# Patient Record
Sex: FEMALE | Race: WHITE | NOT HISPANIC OR LATINO | Employment: OTHER | URBAN - METROPOLITAN AREA
[De-identification: names, ages, dates, MRNs, and addresses within clinical notes are randomized per-mention and may not be internally consistent; named-entity substitution may affect disease eponyms.]

---

## 2017-01-01 ENCOUNTER — TRANSCRIBE ORDERS (OUTPATIENT)
Dept: ADMINISTRATIVE | Age: 82
End: 2017-01-01

## 2017-01-01 ENCOUNTER — APPOINTMENT (OUTPATIENT)
Dept: LAB | Age: 82
End: 2017-01-01
Payer: MEDICARE

## 2017-01-01 ENCOUNTER — ALLSCRIPTS OFFICE VISIT (OUTPATIENT)
Dept: OTHER | Facility: OTHER | Age: 82
End: 2017-01-01

## 2017-01-01 ENCOUNTER — GENERIC CONVERSION - ENCOUNTER (OUTPATIENT)
Dept: OTHER | Facility: OTHER | Age: 82
End: 2017-01-01

## 2017-01-01 DIAGNOSIS — R73.01 IMPAIRED FASTING GLUCOSE: ICD-10-CM

## 2017-01-01 DIAGNOSIS — G62.9 POLYNEUROPATHY: ICD-10-CM

## 2017-01-01 DIAGNOSIS — J84.10 PULMONARY FIBROSIS (HCC): ICD-10-CM

## 2017-01-01 DIAGNOSIS — J31.0 CHRONIC RHINITIS: ICD-10-CM

## 2017-01-01 LAB
EST. AVERAGE GLUCOSE BLD GHB EST-MCNC: 120 MG/DL
HBA1C MFR BLD: 5.8 % (ref 4.2–6.3)
VIT B12 SERPL-MCNC: 603 PG/ML (ref 100–900)

## 2017-01-01 PROCEDURE — 82607 VITAMIN B-12: CPT

## 2017-01-01 PROCEDURE — 36415 COLL VENOUS BLD VENIPUNCTURE: CPT

## 2017-01-01 PROCEDURE — 83036 HEMOGLOBIN GLYCOSYLATED A1C: CPT

## 2017-01-11 ENCOUNTER — ALLSCRIPTS OFFICE VISIT (OUTPATIENT)
Dept: OTHER | Facility: OTHER | Age: 82
End: 2017-01-11

## 2017-01-11 DIAGNOSIS — E78.5 HYPERLIPIDEMIA: ICD-10-CM

## 2017-01-11 DIAGNOSIS — I10 ESSENTIAL (PRIMARY) HYPERTENSION: ICD-10-CM

## 2017-01-18 ENCOUNTER — APPOINTMENT (OUTPATIENT)
Dept: LAB | Age: 82
End: 2017-01-18
Payer: MEDICARE

## 2017-01-18 ENCOUNTER — TRANSCRIBE ORDERS (OUTPATIENT)
Dept: ADMINISTRATIVE | Age: 82
End: 2017-01-18

## 2017-01-18 DIAGNOSIS — I10 ESSENTIAL (PRIMARY) HYPERTENSION: ICD-10-CM

## 2017-01-18 DIAGNOSIS — E78.5 HYPERLIPIDEMIA: ICD-10-CM

## 2017-01-18 LAB
ALBUMIN SERPL BCP-MCNC: 3.8 G/DL (ref 3.5–5)
ALP SERPL-CCNC: 66 U/L (ref 46–116)
ALT SERPL W P-5'-P-CCNC: 31 U/L (ref 12–78)
ANION GAP SERPL CALCULATED.3IONS-SCNC: 7 MMOL/L (ref 4–13)
AST SERPL W P-5'-P-CCNC: 18 U/L (ref 5–45)
BILIRUB SERPL-MCNC: 0.55 MG/DL (ref 0.2–1)
BUN SERPL-MCNC: 13 MG/DL (ref 5–25)
CALCIUM SERPL-MCNC: 9.9 MG/DL (ref 8.3–10.1)
CHLORIDE SERPL-SCNC: 100 MMOL/L (ref 100–108)
CHOLEST SERPL-MCNC: 141 MG/DL (ref 50–200)
CO2 SERPL-SCNC: 32 MMOL/L (ref 21–32)
CREAT SERPL-MCNC: 0.74 MG/DL (ref 0.6–1.3)
ERYTHROCYTE [DISTWIDTH] IN BLOOD BY AUTOMATED COUNT: 12.7 % (ref 11.6–15.1)
GFR SERPL CREATININE-BSD FRML MDRD: >60 ML/MIN/1.73SQ M
GLUCOSE SERPL-MCNC: 110 MG/DL (ref 65–140)
HCT VFR BLD AUTO: 40.4 % (ref 34.8–46.1)
HDLC SERPL-MCNC: 50 MG/DL (ref 40–60)
HGB BLD-MCNC: 13.2 G/DL (ref 11.5–15.4)
LDLC SERPL CALC-MCNC: 63 MG/DL (ref 0–100)
MCH RBC QN AUTO: 29.4 PG (ref 26.8–34.3)
MCHC RBC AUTO-ENTMCNC: 32.7 G/DL (ref 31.4–37.4)
MCV RBC AUTO: 90 FL (ref 82–98)
PLATELET # BLD AUTO: 268 THOUSANDS/UL (ref 149–390)
PMV BLD AUTO: 10 FL (ref 8.9–12.7)
POTASSIUM SERPL-SCNC: 4.3 MMOL/L (ref 3.5–5.3)
PROT SERPL-MCNC: 8.3 G/DL (ref 6.4–8.2)
RBC # BLD AUTO: 4.49 MILLION/UL (ref 3.81–5.12)
SODIUM SERPL-SCNC: 139 MMOL/L (ref 136–145)
TRIGL SERPL-MCNC: 140 MG/DL
WBC # BLD AUTO: 8.65 THOUSAND/UL (ref 4.31–10.16)

## 2017-01-18 PROCEDURE — 85027 COMPLETE CBC AUTOMATED: CPT

## 2017-01-18 PROCEDURE — 80061 LIPID PANEL: CPT

## 2017-01-18 PROCEDURE — 80053 COMPREHEN METABOLIC PANEL: CPT

## 2017-01-18 PROCEDURE — 36415 COLL VENOUS BLD VENIPUNCTURE: CPT

## 2017-10-26 NOTE — PROGRESS NOTES
Assessment  1  Pulmonary fibrosis (515) (J84 10)   2  Chronic hypoxemic respiratory failure (518 83,799 02) (J96 11)    Plan  Chronic hypoxemic respiratory failure    · Azithromycin 250 MG Oral Tablet; Take as directed   Rx By: Dawna Cline; Dispense: 5 Days ; #:1 X 6 Tablet Box; Refill: 2;For: Chronic hypoxemic respiratory failure; SETH = N; Print Rx   · Oxygen Supplies; Status:Complete;   Done: 26LCY3328   Perform:[facility]; Order Comments:Please provide patient with 2 Oxymizers; Due:07Sfy3394;Ordered; For:Chronic hypoxemic respiratory failure; Ordered By:Canelo De La Rosa;  Pulmonary fibrosis    · 1 - Fide Phillips DO  Palliative Care Co-Management  *  Status: Hold For - Scheduling   Requested for: 14Qgh7576   Ordered; For: Pulmonary fibrosis; Ordered By: Dawna Cline Performed:  Due: 49IHU0736  Care Summary provided  : Yes   · Follow-up visit in 1 year Evaluation and Treatment  Follow-up  Status: Hold For -  Scheduling  Requested for: 84Mpx0908   Ordered; For: Pulmonary fibrosis; Ordered By: Dawna Cline Performed:  Due: 05BHA2881    Discussion/Summary  Discussion Summary:   With the patient's history of progressive hypoxic respiratory failure I have requested an Oxymizer to help decrease exertional dyspnea/ hypoxia  I have recommended that she be seen by 1 of our palliative care physicians to discuss symptomatic treatment for end stage lung disease  I provided her with a 5 wishes booklet today for her to review and complete prior to that visit  We sat down and completed a POLST form  We will scan this in the chart and I gave her the original pink copy  She has refused testing in the past and at this point I agree there is no indication for repeat testing  I would not start her on any anti fibrotic agents at this point with the severity of her lung disease, her unwillingness to proceed with testing, and her very clear goals of simply being comfortable     Counseling Documentation With Imm: The patient was counseled regarding  total time of encounter was 40 minutes-- and-- 30 minutes was spent counseling  Palliative care, completing POLST   Goals and Barriers: The patient has the current Goals: To be comfortable  The patent has the current Barriers: None  Patient's Capacity to Self-Care: Patient is unable to Self-Care: She has a  to help her get to the store and appointments because she is legally blind  Medication SE Review and Pt Understands Tx: The treatment plan was reviewed with the patient/guardian  The patient/guardian understands and agrees with the treatment plan      Active Problems  1  Arteriosclerotic heart disease (ASHD) (414 00) (I25 10)   2  Chronic hypoxemic respiratory failure (518 83,799 02) (J96 11)   3  Chronic rhinitis (472 0) (J31 0)   4  Headache (784 0) (R51)   5  Hyperlipidemia (272 4) (E78 5)   6  Hypertension (401 9) (I10)   7  Impaired fasting glucose (790 21) (R73 01)   8  Ischemic colitis (557 9) (K55 9)   9  Peripheral neuropathy (356 9) (G62 9)   10  Pulmonary fibrosis (515) (J84 10)   11  Stroke Syndrome (436)    Chief Complaint  Chief Complaint Chronic Condition [facility]: Patient is here today for follow up of chronic conditions described in HPI  History of Present Illness  HPI: The patient continues to have significant shortness of breath with any movement  She states that simply walking to the bathroom she has to take her oxygen on 5 L nasal cannula and she still will get short of breath  She denies any associated chest pain, cough or wheezing  She states she does not have significant shortness of breath sitting still  She was initially diagnosed with interstitial lung disease 8 years ago and started on supplemental oxygen at least 4-5 years ago  She takes no inhalers or medications  She is using Zithromax only if she develops an upper respiratory tract infection which is less than 1 a year  She likes to keep the prescription on hand however      She has previously been tried on steroids without any significant change in her symptoms  Her statement to me is I am ready to go just please make sure uncomfortable   at 1 time she was referred to palliative care / hospice however she had a bad interaction with the team and has not called back  She has never met with 1 of our palliative care physicians  She has advanced directives and states that she is DNR and has a wrist band that states such however she does not have a POLST form completed  Review of Systems  Complete-Female - Pulm:   Constitutional: No fever, no chills, feels well, no tiredness, no recent weight gain or weight loss  Eyes: no complaints of vision problems  ENT: no rhinitis, no PND, no epistaxis  Cardiovascular: no palpitations, no chest pain  Respiratory: as noted in HPI  Gastrointestinal: no complaints of esophageal reflux, no abdominal pain  Genitourinary: no dysuria  Musculoskeletal: no arthralgias, no joint swelling, no myalgias  Integumentary: no rash, no lesions  Neurological: no headache, no fainting, no weakness  Psychiatric: no anxiety, no depression  Hematologic/Lymphatic: ? 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) (I25 2)    Surgical History  1  History of Appendectomy   2  History of Cataract Surgery   3  History of Cholecystotomy  Surgical History Reviewed: The surgical history was reviewed and updated today  Family History  Mother    1  Family history of cardiac disorder (V17 49) (Z82 49)   2  Family history of stroke (V17 1) (Z82 3)  Father    3  Family history of cardiac disorder (V17 49) (Z82 49)  Daughter    4  Family history of Brain Surgery  Son    5  Family history of Family Health Status 1  Children    6   Family history of Son 1-1   Family History Reviewed: The family history was reviewed and updated today  Social History   · Denied: History of Alcohol Use (History)   · Being A Social Drinker   ·    · Former smoker (C74 02) (O90 690)   · Quit 1984  Smoked for about 45 years, two packs of cigarettes a day  · Housewife or homemaker  Social History Reviewed: The social history was reviewed and updated today  The social history was reviewed and is unchanged  Current Meds   1  AmLODIPine Besylate 5 MG Oral Tablet; TAKE 1 TABLET TWICE DAILY; Therapy: 63HAV4472 to (Evaluate:71Xhy8257)  Requested for: 19Apr2017; Last   Rx:26Kkm4761 Ordered   2  Atorvastatin Calcium 40 MG Oral Tablet; 3 TIMES WEEKLY  Requested for: 19Apr2017;   Last Rx:19Apr2017 Ordered   3  Azithromycin 250 MG Oral Tablet; Take as directed; Therapy: 92Zpf8944 to (Angely Barkley)  Requested for: 79Ifr0641; Last   Rx:09Pcj8689 Ordered   4  Candesartan Cilexetil 16 MG Oral Tablet; TAKE 1 TABLET DAILY  Requested for:   19Apr2017; Last Rx:19Apr2017 Ordered   5  Centrum Silver TABS; Take 1 tablet daily Recorded   6  Clopidogrel Bisulfate 75 MG Oral Tablet; TAKE 1 TABLET DAILY  Requested for:   19Apr2017; Last Rx:19Apr2017 Ordered   7  CoQ-10 100 MG Oral Capsule; take 1 capsule daily; Therapy: (Recorded:45Mkw2496) to Recorded   8  Fluticasone Propionate 50 MCG/ACT Nasal Suspension; use 2 sprays in each nostril   once daily; Therapy: 68YCE2161 to (Evaluate:96Nfx6985)  Requested for: 42IES3432; Last   Rx:07Unm4580 Ordered   9  Nadolol 20 MG Oral Tablet; Take 1 tablet twice daily; Therapy: 47ZOJ5172 to (Evaluate:64Quw4675)  Requested for: 19Apr2017; Last   Rx:19Apr2017 Ordered   10  Nephrocaps CAPS; TAKE 1 CAPSULE DAILY; Therapy: (Recorded:62Law0282) to Recorded   11  Nitrostat 0 4 MG Sublingual Tablet Sublingual; PLACE 1 TABLET UNDER THE TONGUE    EVERY 5 MINUTES FOR UP TO 3 DOSES AS NEEDED FOR CHEST PAIN  CALL    911 IF PAIN PERSISTS;     Therapy: 44UIR9868 to (Evaluate:44Uqz1331)  Requested for: 78Bmo3797; Last    Rx:60Apa1111 Ordered   12  Vitamin D 1000 UNIT CAPS; take 1 capsule daily; Therapy: (Recorded:49Bld2896) to Recorded  Medication List Reviewed: The medication list was reviewed and updated today  Allergies  1  Losartan Potassium TABS   2  Aspirin TABS   3  Carvedilol TABS   4  Diovan TABS   5  HydroCHLOROthiazide CAPS   6  Codeine Derivatives   7  Corticosteroids   8  Valium TABS   9  Vitamin E CAPS  10  IVP Dye   11  Shellfish    Vitals  Vital Signs    Recorded: 15Sep2017 09:57AM   Temperature 97 2 F   Heart Rate 71   Respiration 18   Systolic 919   Diastolic 64   Height 5 ft 7 in   Weight 182 lb    BMI Calculated 28 51   BSA Calculated 1 94   O2 Saturation 92, RA   FiO2 5L/min, RA     Physical Exam    Constitutional   General appearance: No acute distress, well appearing and well nourished  Neck   Neck: Supple, symmetric, trachea midline, no masses  Jugular veins: Normal     Pulmonary   Chest: Normal     Respiratory effort: Abnormal  -- Mild conversational dyspnea  Cardiovascular   Examination of extremities for edema and/or varicosities: Normal     Abdomen   Abdomen: Soft, non-tender  Lymphatic   Palpation of lymph nodes in neck: No lymphadenopathy  Musculoskeletal   Gait and station: Normal     Neurologic   Mental Status: Normal  Not confused, no evidence of dementia, good comprehension, good concentration  Skin   Skin and subcutaneous tissue: Limited exam shows no rash      Psychiatric   Orientation to person, place and time: Normal     Mood and affect: Normal        Future Appointments    Date/Time Provider Specialty Site   09/21/2017 02:40 PM [facility] [address] [place] [address]   01/10/2018 09:00 AM Kristie Gonzlaez DO Internal Medicine [facility] INTERNAL MED     Signatures   Electronically signed by : Roland Coello DO; Sep 15 2017 10:54AM EST                       (Author)    Electronically signed by : Nida Herrons, DO; Sep 15 2017 10:59AM EST                       (Author)

## 2017-10-27 NOTE — CONSULTS
Assessment  1  Dyspnea (786 09) (R06 00)   2  Chronic hypoxemic respiratory failure (518 83,799 02) (J96 11)   3  Pulmonary fibrosis (515) (J84 10)   4  Chronic rhinitis (472 0) (J31 0)    Plan  Chronic hypoxemic respiratory failure, Pulmonary fibrosis    · Palliative Flow Sheet; Status:Complete - Retrospective Authorization;   Done: 24WSM2153  02:27PM   Performed: In Office; Due:46Blr5184; Last Updated Michi Mckinney; 9/21/2017 2:27:46 PM;Ordered; For:Chronic hypoxemic respiratory failure, Pulmonary fibrosis; Ordered By:Fide Phillips;  Dyspnea    · Colace 100 MG Oral Capsule; TAKE 1 CAPSULE Daily PRN constipation   Rx By: Dea Hay; Dispense: 30 Days ; #:30 Capsule; Refill: 2;For: Dyspnea; SETH = N; Print Rx  Dyspnea, Pulmonary fibrosis    · From  Morphine Sulfate 20 MG/5ML Oral Solution SWALLOW 2 MG Every 6  hours PRN dyspnea To Morphine Sulfate 20 MG/5ML Oral Solution SWALLOW 2 5 MG  Every 6 hours   Rx By: Dea Hay; Dispense: 30 Days ; #:30 ML; Refill: 0;For: Dyspnea, Pulmonary fibrosis; SETH = N; Print Rx  Pulmonary fibrosis    · Occupational Therapy Referral Other Co-Management  *wheelchair evaluation  Status:  Active  Requested for: 26MRF5781   Ordered; For: Pulmonary fibrosis; Ordered By: Dea Hay Performed:  Due: 35HRU7581  are Referring to a non- Preferred Provider : Services not provided in network  Care Summary provided  : Yes    Discussion/Summary  Discussion Summary:   Ms Lisa Lee is a very pleasant 80year old female with advanced pulmonary fibrosis and chronic hypoxic respiratory failure  Dyspnea- discussed use of low dose opioids and risks/benefits  Will trial a low dose of Roxanol at 2 5 mg PRN  Colace was also prescribed to prevent opioid induced constipation  Discussed alternate techniques as well- including portable fan and energy conservation  End of life- We spent time discussing what the end of life 'looks like' for a patient with advanced illness   Discussed symptoms that may occur and how we help to palliative them to allow for comfort at the time of death  Concerns were addressed to her satisfaction  Goals of care- POLST done  DNR/DNI, comfort measuresI had a face to face encounter today to assess Ms Berman for need of wheelchair  Patient has limited mobility and cannot engage in all of her ADLs because of her limited mobility  She has a very advanced lung disease, pulmonary fibrosis, which is not curative and she is not seeking disease directed cares and is already on a high dose of oxygen for the most minimal of exertion (ie  sitting to standing position)  A cane or walker would not be appropriate given her oxygen tubing and tank- this would put her at an exceedingly high fall risk  Given my assessment and examination of Ms Berman- will move forward with prescription for a wheelchair  Return to our care in 1 month  Counseling Documentation With Imm: The patient, patient's caretaker was counseled regarding instructions for management,-- risk factor reductions,-- prognosis,-- patient and family education,-- impressions,-- risks and benefits of treatment options  total time of encounter was 60 minutes-- and-- 45 minutes was spent counseling  Goals and Barriers: The patient has the current Goals: Improved breathing  The patent has the current Barriers: Advanced pulmonary fibrosis  Patient's Capacity to Self-Care: Patient is able to Self-Care  Medication SE Review and Pt Understands Tx: Possible side effects of new medications were reviewed with the patient/guardian today  The treatment plan was reviewed with the patient/guardian  The patient/guardian understands and agrees with the treatment plan   Understands and agrees with treatment plan: The treatment plan was reviewed with the patient/guardian   The patient/guardian understands and agrees with the treatment plan      Chief Complaint  Chief Complaint Free Text Note Form: new referral      Advance Directives  Advance Directive [address]954 [address] [place]: The patient has a signed POLST form located a scanned copy is in the patient's chart  Capacity/Competence: This patient has full decision making capacity for discussion of advance care planning  Summary of Advance Directive Conversation  DNR/DNI, comfort measures  History of Present Illness  HPI: Ms Esther Collins is a pleasant 80year old female referred to palliative medicine for advanced pulmonary fibrosis and chronic hypoxic respiratory failure  She has had progressive disease over the last few years and is currently on 5L of oxygen when engaging in activities  She has been very clear with many providers that she does NOT want heroic measures NOR does she want to return to the hospital  [facility] [place] has been completed and is up to date per review of it  She has tremendous difficulty with any exertion and needs assistance with cooking, cleaning and transportation  wanted to discuss management of her dyspnea and a long time was spent on discussing risks and benfits of low dose opioids  denies anxiety or cough with her breathing  refuses steroids as she had a bad reaction with them in the past  does admit to significant fatigue and attributes that to both old age and her lung disease  wanted to discuss what dying looks like and a long time was spent counseling her on end of life for patient's with advanced lung disease  Review of Systems  Complete-Female:   Constitutional: feeling poorly  Eyes: No complaints of eye pain, no red eyes, no eyesight problems, no discharge, no dry eyes, no itching of eyes  ENT: no complaints of earache, no loss of hearing, no nose bleeds, no nasal discharge, no sore throat, no hoarseness  Cardiovascular: No complaints of slow heart rate, no fast heart rate, no chest pain, no palpitations, no leg claudication, no lower extremity edema  Respiratory: shortness of breath-- and-- shortness of breath during exertion, but-- no cough-- and-- no wheezing     Gastrointestinal: No complaints of abdominal pain, no constipation, no nausea or vomiting, no diarrhea, no bloody stools  Genitourinary: No complaints of dysuria, no incontinence, no pelvic pain, no dysmenorrhea, no vaginal discharge or bleeding  Musculoskeletal: No complaints of arthralgias, no myalgias, no joint swelling or stiffness, no limb pain or swelling  Integumentary: No complaints of skin rash or lesions, no itching, no skin wounds, no breast pain or lump  Neurological: No complaints of headache, no confusion, no convulsions, no numbness, no dizziness or fainting, no tingling, no limb weakness, no difficulty walking  Psychiatric: Not suicidal, no sleep disturbance, no anxiety or depression, no change in personality, no emotional problems  Endocrine: No complaints of proptosis, no hot flashes, no muscle weakness, no deepening of the voice, no feelings of weakness  Hematologic/Lymphatic: No complaints of swollen glands, no swollen glands in the neck, does not bleed easily, does not bruise easily  ROS Reviewed:   ROS reviewed  Active Problems  1  Arteriosclerotic heart disease (ASHD) (414 00) (I25 10)   2  Chronic hypoxemic respiratory failure (518 83,799 02) (J96 11)   3  Chronic rhinitis (472 0) (J31 0)   4  Headache (784 0) (R51)   5  Hyperlipidemia (272 4) (E78 5)   6  Hypertension (401 9) (I10)   7  Impaired fasting glucose (790 21) (R73 01)   8  Ischemic colitis (557 9) (K55 9)   9  Peripheral neuropathy (356 9) (G62 9)   10  Pulmonary fibrosis (515) (J84 10)   11  Stroke Syndrome (436)    Past Medical History  1  History of Cardiac Catheterization  (Diagnostic)   2  History of urinary tract infection (V13 02) (Z87 440)   3  History of Ischemic colitis (557 9) (K55 9)   4  Old myocardial infarction (12) (I25 2)  Active Problems And Past Medical History Reviewed: The active problems and past medical history were reviewed and updated today  Surgical History  1  History of Appendectomy   2   History of Cataract Surgery   3  History of Cholecystotomy  Surgical History Reviewed: The surgical history was reviewed and updated today  Family History  Mother    1  Family history of cardiac disorder (V17 49) (Z82 49)   2  Family history of stroke (V17 1) (Z82 3)  Father    3  Family history of cardiac disorder (V17 49) (Z82 49)  Daughter    4  Family history of Brain Surgery  Son    5  Family history of Family Health Status 1  Children    6  Family history of Son 1-1   Family History Reviewed: The family history was reviewed and updated today  Social History   · Denied: History of Alcohol Use (History)   · Being A Social Drinker   ·    · Former smoker (F65 78) (I13 311)   · [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  Social History Reviewed: The social history was reviewed and updated today  Current Meds   1  AmLODIPine Besylate 5 MG Oral Tablet; TAKE 1 TABLET TWICE DAILY; Therapy: 98IFA5888 to (Evaluate:23Iaa5368)  Requested for: 2017; Last   Rx:31Iea2107 Ordered   2  Atorvastatin Calcium 40 MG Oral Tablet; 3 TIMES WEEKLY  Requested for: 2017;   Last Rx:2017 Ordered   3  Azithromycin 250 MG Oral Tablet; Take as directed; Therapy: 46Xbi3731 to (Evaluate:53Chg1583)  Requested for: 81Wjz3438; Last   Rx:54Kqq5259 Ordered   4  Candesartan Cilexetil 16 MG Oral Tablet; TAKE 1 TABLET DAILY  Requested for:   2017; Last Rx:47Ifj8935 Ordered   5  Centrum Silver TABS; Take 1 tablet daily Recorded   6  Clopidogrel Bisulfate 75 MG Oral Tablet; TAKE 1 TABLET DAILY  Requested for:   2017; Last Rx:43Rhb7262 Ordered   7  CoQ-10 100 MG Oral Capsule; take 1 capsule daily; Therapy: (Recorded:76Gqo6724) to Recorded   8  Fluticasone Propionate 50 MCG/ACT Nasal Suspension; use 2 sprays in each nostril   once daily; Therapy: 10HIF2704 to (Evaluate:46Jvl3093)  Requested for: ; Last   Rx:03Ecu4642 Ordered   9  Nadolol 20 MG Oral Tablet; Take 1 tablet twice daily;    Therapy: 26WGY8862 to (Evaluate:16Oct2017)  Requested for: 53Odp4343; Last   Rx:98Opn1895 Ordered   10  Nephrocaps CAPS; TAKE 1 CAPSULE DAILY; Therapy: (Recorded:15Gwn0587) to Recorded   11  Nitrostat 0 4 MG Sublingual Tablet Sublingual; PLACE 1 TABLET UNDER THE TONGUE    EVERY 5 MINUTES FOR UP TO 3 DOSES AS NEEDED FOR CHEST PAIN  CALL    911 IF PAIN PERSISTS; Therapy: 94AWJ7818 to (Evaluate:23Mic2468)  Requested for: 58Wlj4224; Last    Rx:96Vmy2919 Ordered   12  Vitamin D 1000 UNIT CAPS; take 1 capsule daily; Therapy: (Recorded:81Yei3752) to Recorded  Medication List Reviewed: The medication list was reviewed and updated today  Allergies  1  Losartan Potassium TABS   2  Aspirin TABS   3  Carvedilol TABS   4  Diovan TABS   5  HydroCHLOROthiazide CAPS   6  Codeine Derivatives   7  Corticosteroids   8  Valium TABS   9  Vitamin E CAPS  10  IVP Dye   11  Shellfish    Vitals  Vital Signs    Recorded: 19DGN9264 02:32PM   Temperature 97 9 F   Heart Rate 64   Respiration 20   Systolic 270   Diastolic 60   Height 5 ft 7 in   Weight 191 lb    BMI Calculated 29 92   BSA Calculated 1 98   O2 Saturation 95     Physical Exam    Constitutional   General appearance: No acute distress, well appearing and well nourished  Eyes   Conjunctiva and lids: No swelling, erythema or discharge  Ears, Nose, Mouth, and Throat   Oropharynx: Normal with no erythema, edema, exudate or lesions  Pulmonary   Respiratory effort: Abnormal  -- pursed lip breathing when talking for prolonged period  Cardiovascular   Examination of extremities for edema and/or varicosities: Normal     Abdomen   Abdomen: Non-tender, no masses  Musculoskeletal   Gait and station: Normal     Skin   Skin and subcutaneous tissue: Normal without rashes or lesions  Psychiatric   Orientation to person, place, and time: Normal         KPS Score and PPS       Total Percentage of Normal Performance Status 40        Results/Data  Palliative Flow Sheet 31Xai5851 02: 27PM Chel Bones     Test Name Result Flag Reference   Pain 0     Tiredness 0     Nausea 0     Depression 0     Anxious 0     Drowsy 0     Best Appetite 5     Best Feeling of Well Being 5     Shortness of Breath 10         Future Appointments    Date/Time Provider Specialty Site   01/10/2018 09:00 AM Danii [facility] DO [facility]     Signatures   Electronically signed by : Yelitza Jeffries DO; Sep 21 2017  3:54PM EST                       (Author)

## 2017-12-12 NOTE — PROGRESS NOTES
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)   2  Chronic hypoxemic respiratory failure (518 83,799 02) (J96 11)   3  Peripheral neuropathy (356 9) (G62 9)   4  Pulmonary fibrosis (515) (J84 10)    Plan  Anxiety disorder due to general medical condition    · ALPRAZolam 0 25 MG Oral Tablet   Rx By: Eliezer Coreas; Dispense: 30 Days ; #:90 Tablet; Refill: 0;Anxiety disorder due to general medical condition; SETH = N; Print Rx   · ALPRAZolam 0 25 MG Oral Tablet (Xanax); Take 1 tablet 3 times daily as needed foranxiety   Rx By: Eliezer Coreas; Dispense: 30 Days ; #:60 Tablet; Refill: 0;Anxiety disorder due to general medical condition; SETH = N; Print Rx; Msg to Pharmacy: brand name only  Chronic rhinitis    · Fluticasone Propionate 50 MCG/ACT Nasal Suspension; use 2 sprays in eachnostril once daily   Rx By: Eliezer Coreas; Dispense: 30 Days ; #:1 X 16 GM Bottle; Refill: 1;Chronic rhinitis; SETH = N; Print Rx    Discussion/Summary  Discussion Summary:   Ms Lakeisha Walter is a very pleasant 80year old female with advanced pulmonary fibrosis and chronic hypoxic respiratory failure  Dyspnea with chronic respiratory failure with evidence of progression of disease with low oxygen saturation of 84% while in the office  Will prescribe higher dose concentrator to allow 10 L of oxygen delivery  Continue as needed Xanax, new script provided for brand name only  Trial of FlonaseGoals of care- POLST done  DNR/DNI, comfort measuresWill plan to revisit idea of using PALS program as it is getting harder for her to make doctor's appointments  Return to care as needed  Counseling Documentation With Imm: The patient, patient's caretaker was counseled regarding instructions for management,-- risk factor reductions,-- prognosis,-- patient and family education,-- impressions,-- risks and benefits of treatment options  total time of encounter was 20 minutes-- and-- 15 minutes was spent counseling     Goals and Barriers: The patient has the current Goals: Improved breathing  The patent has the current Barriers: Advanced pulmonary fibrosis  Patient's Capacity to Self-Care: Patient is able to Self-Care  Medication SE Review and Pt Understands Tx: Possible side effects of new medications were reviewed with the patient/guardian today  The treatment plan was reviewed with the patient/guardian  The patient/guardian understands and agrees with the treatment plan   Understands and agrees with treatment plan: The treatment plan was reviewed with the patient/guardian  The patient/guardian understands and agrees with the treatment plan      Chief Complaint  Chief Complaint Free Text Note Form: follow up      Advance Directives  Advance Directive [employer]: The patient has a signed POLST form located a scanned copy is in the patient's chart  Capacity/Competence: This patient has full decision making capacity for discussion of advance care planning  Summary of Advance Directive Conversation  DNR/DNI, comfort measures  History of Present Illness  HPI: Ms Izzy Mattson is a pleasant 80year old female referred to palliative medicine for advanced pulmonary fibrosis and chronic hypoxic respiratory failure  Overall, she is beginning to show evidence of decline  She is having worsening dyspnea  She did not tolerate morphine or alprazolam  She states she has done better with brand name medications and is requesting to try Xanax as she feels she is done better with this in the past worsening respiratory status has resulted in less mobility  In the office today her oxygen was 84% with minimal movement  notes trouble with smell and taste  also notes that her nose is constantly stuffed and has not gotten relief with saline spray  Review of Systems  Complete-Female:  Constitutional: feeling poorly  Eyes: No complaints of eye pain, no red eyes, no eyesight problems, no discharge, no dry eyes, no itching of eyes    ENT: no complaints of earache, no loss of hearing, no nose bleeds, no nasal discharge, no sore throat, no hoarseness  Cardiovascular: No complaints of slow heart rate, no fast heart rate, no chest pain, no palpitations, no leg claudication, no lower extremity edema  Respiratory: shortness of breath-- and-- shortness of breath during exertion, but-- no cough-- and-- no wheezing  Gastrointestinal: No complaints of abdominal pain, no constipation, no nausea or vomiting, no diarrhea, no bloody stools  Genitourinary: No complaints of dysuria, no incontinence, no pelvic pain, no dysmenorrhea, no vaginal discharge or bleeding  Musculoskeletal: No complaints of arthralgias, no myalgias, no joint swelling or stiffness, no limb pain or swelling  Integumentary: No complaints of skin rash or lesions, no itching, no skin wounds, no breast pain or lump  Neurological: No complaints of headache, no confusion, no convulsions, no numbness, no dizziness or fainting, no tingling, no limb weakness, no difficulty walking  Psychiatric: Not suicidal, no sleep disturbance, no anxiety or depression, no change in personality, no emotional problems  Endocrine: No complaints of proptosis, no hot flashes, no muscle weakness, no deepening of the voice, no feelings of weakness  Hematologic/Lymphatic: No complaints of swollen glands, no swollen glands in the neck, does not bleed easily, does not bruise easily  ROS Reviewed:   ROS reviewed  Active Problems  1  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)   2  Arteriosclerotic heart disease (ASHD) (414 00) (I25 10)   3  Chronic hypoxemic respiratory failure (518 83,799 02) (J96 11)   4  Chronic rhinitis (472 0) (J31 0)   5  Dyspnea (786 09) (R06 00)   6  Headache (784 0) (R51)   7  Hyperlipidemia (272 4) (E78 5)   8  Hypertension (401 9) (I10)   9  Impaired fasting glucose (790 21) (R73 01)   10  Ischemic colitis (557 9) (K55 9)   11   Peripheral neuropathy (356 9) (G62 9)   12  Pulmonary fibrosis (515) (J84 10)   13  Stroke Syndrome (436)    Past Medical History  1  History of Cardiac Catheterization  (Diagnostic)   2  History of urinary tract infection (V13 02) (Z87 440)   3  History of Ischemic colitis (557 9) (K55 9)   4  Old myocardial infarction (12) (I25 2)  Active Problems And Past Medical History Reviewed: The active problems and past medical history were reviewed and updated today  Surgical History  1  History of Appendectomy   2  History of Cataract Surgery   3  History of Cholecystotomy  Surgical History Reviewed: The surgical history was reviewed and updated today  Family History  Mother    1  Family history of cardiac disorder (V17 49) (Z82 49)   2  Family history of stroke (V17 1) (Z82 3)  Father    3  Family history of cardiac disorder (V17 49) (Z82 49)  Daughter    4  Family history of Brain Surgery  Son    5  Family history of Family Health Status 1  Children    6  Family history of Son 1-1   Family History Reviewed: The family history was reviewed and updated today  Social History     · Denied: History of Alcohol Use (History)   · Being A Social Drinker   ·    · Former smoker (E66 02) (R46 196)   · [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  Social History Reviewed: The social history was reviewed and updated today  Current Meds   1  ALPRAZolam 0 25 MG Oral Tablet; Take 1 tablet 3 times daily as needed for anxiety; Therapy: 96ZKQ8986 to (Evaluate:40Hos9841); Last Rx:2017 Ordered   2  AmLODIPine Besylate 5 MG Oral Tablet; TAKE 1 TABLET TWICE DAILY; Therapy: 56IWY1946 to (Evaluate:2018)  Requested for: 60PVU9923; Last Rx:20Akl2884 Ordered   3  Atorvastatin Calcium 40 MG Oral Tablet; 3 TIMES WEEKLY  Requested for: 00Stn6543; Last Rx:08Tec4054 Ordered   4  Azithromycin 250 MG Oral Tablet; Take as directed; Therapy: 07Ziz8941 to (Evaluate:56Jxk8989)  Requested for: 18Ael9374; Last Rx:94Hud6863 Ordered   5   Candesartan Cilexetil 16 MG Oral Tablet; TAKE 1 TABLET DAILY  Requested for: 19Apr2017; Last Rx:19Apr2017 Ordered   6  Centrum Silver TABS; Take 1 tablet daily Recorded   7  Clopidogrel Bisulfate 75 MG Oral Tablet; TAKE 1 TABLET DAILY  Requested for: 19Apr2017; Last Rx:19Apr2017 Ordered   8  Colace 100 MG Oral Capsule; TAKE 1 CAPSULE Daily PRN constipation; Therapy: 34FOI4545 to (Evaluate:52Cne4929); Last Rx:94Sbn3698 Ordered   9  CoQ-10 100 MG Oral Capsule; take 1 capsule daily; Therapy: (Recorded:67Bue7942) to Recorded   10  Fluticasone Propionate 50 MCG/ACT Nasal Suspension; use 2 sprays in each nostril  once daily; Therapy: 75OYR3157 to (Evaluate:18Yys7482)  Requested for: 22LQX7117; Last  Rx:20May2015 Ordered   11  Nadolol 20 MG Oral Tablet; Take 1 tablet twice daily; Therapy: 48LNB3780 to (Evaluate:27Mpm0836)  Requested for: 35CDN9784; Last  Rx:20Nov2017 Ordered   12  Nephrocaps CAPS; TAKE 1 CAPSULE DAILY; Therapy: (Recorded:38Xpb0467) to Recorded   13  Nitrostat 0 4 MG Sublingual Tablet Sublingual; PLACE 1 TABLET UNDER THE TONGUE  EVERY 5 MINUTES FOR UP TO 3 DOSES AS NEEDED FOR CHEST PAIN  CALL  911 IF PAIN PERSISTS; Therapy: 37SHB1957 to (Evaluate:78Kyr8498)  Requested for: 34Cup7438; Last  Rx:53Ork0517 Ordered   14  Vitamin D 1000 UNIT CAPS; take 1 capsule daily; Therapy: (Recorded:79Fby8351) to Recorded  Medication List Reviewed: The medication list was reviewed and updated today  Allergies  1  Losartan Potassium TABS   2  Aspirin TABS   3  Carvedilol TABS   4  Diovan TABS   5  HydroCHLOROthiazide CAPS   6  Codeine Derivatives   7  Corticosteroids   8  Valium TABS   9  Vitamin E CAPS  10  IVP Dye   11   Shellfish    Vitals  Vital Signs    Recorded: 11Dec2017 03:31PM   Temperature 98 1 F, Oral    Heart Rate 75    Systolic 135, LUE, Sitting    Diastolic 70, LUE, Sitting    Patient Refused Weight Yes Yes   O2 Saturation 83        Physical Exam   Constitutional  General appearance: No acute distress, well appearing and well nourished  Eyes  Conjunctiva and lids: No swelling, erythema or discharge  Ears, Nose, Mouth, and Throat  Oropharynx: Normal with no erythema, edema, exudate or lesions  Pulmonary  Respiratory effort: Abnormal  -- pursed lip breathing when talking for prolonged period  Cardiovascular  Examination of extremities for edema and/or varicosities: Normal    Abdomen  Abdomen: Non-tender, no masses  Musculoskeletal  Gait and station: Normal    Skin  Skin and subcutaneous tissue: Normal without rashes or lesions  Psychiatric  Orientation to person, place, and time: Normal         KPS Score and PPS      Total Percentage of Normal Performance Status 40        Future Appointments    Date/Time Provider Specialty Site   01/10/2018 09:00 AM Luzma Drew DO Internal Medicine [facility] INTERNAL MED       Signatures   Electronically signed by : Linda Jameson DO; Dec 11 2017  7:53PM EST                       (Author)

## 2018-01-01 ENCOUNTER — APPOINTMENT (EMERGENCY)
Dept: RADIOLOGY | Facility: HOSPITAL | Age: 83
DRG: 193 | End: 2018-01-01
Payer: MEDICARE

## 2018-01-01 ENCOUNTER — HOSPITAL ENCOUNTER (INPATIENT)
Facility: HOSPITAL | Age: 83
LOS: 4 days | DRG: 193 | End: 2018-01-21
Attending: EMERGENCY MEDICINE | Admitting: INTERNAL MEDICINE
Payer: MEDICARE

## 2018-01-01 ENCOUNTER — ALLSCRIPTS OFFICE VISIT (OUTPATIENT)
Dept: OTHER | Facility: OTHER | Age: 83
End: 2018-01-01

## 2018-01-01 ENCOUNTER — APPOINTMENT (INPATIENT)
Dept: RADIOLOGY | Facility: HOSPITAL | Age: 83
DRG: 193 | End: 2018-01-01
Payer: MEDICARE

## 2018-01-01 ENCOUNTER — APPOINTMENT (INPATIENT)
Dept: RADIOLOGY | Facility: HOSPITAL | Age: 83
DRG: 193 | End: 2018-01-01
Attending: INTERNAL MEDICINE
Payer: MEDICARE

## 2018-01-01 VITALS
HEIGHT: 67 IN | DIASTOLIC BLOOD PRESSURE: 70 MMHG | SYSTOLIC BLOOD PRESSURE: 125 MMHG | BODY MASS INDEX: 29.51 KG/M2 | WEIGHT: 188 LBS | HEART RATE: 65 BPM | OXYGEN SATURATION: 90 % | TEMPERATURE: 96.9 F | RESPIRATION RATE: 16 BRPM

## 2018-01-01 VITALS
RESPIRATION RATE: 18 BRPM | SYSTOLIC BLOOD PRESSURE: 160 MMHG | TEMPERATURE: 98 F | BODY MASS INDEX: 29.84 KG/M2 | HEART RATE: 66 BPM | DIASTOLIC BLOOD PRESSURE: 70 MMHG | HEIGHT: 67 IN | OXYGEN SATURATION: 90 % | WEIGHT: 190.13 LBS

## 2018-01-01 VITALS
HEIGHT: 67 IN | DIASTOLIC BLOOD PRESSURE: 64 MMHG | WEIGHT: 182 LBS | OXYGEN SATURATION: 92 % | RESPIRATION RATE: 18 BRPM | SYSTOLIC BLOOD PRESSURE: 104 MMHG | BODY MASS INDEX: 28.56 KG/M2 | TEMPERATURE: 97.2 F | HEART RATE: 71 BPM

## 2018-01-01 VITALS
TEMPERATURE: 97.9 F | RESPIRATION RATE: 20 BRPM | SYSTOLIC BLOOD PRESSURE: 140 MMHG | OXYGEN SATURATION: 95 % | WEIGHT: 191 LBS | HEIGHT: 67 IN | BODY MASS INDEX: 29.98 KG/M2 | DIASTOLIC BLOOD PRESSURE: 60 MMHG | HEART RATE: 64 BPM

## 2018-01-01 VITALS
DIASTOLIC BLOOD PRESSURE: 69 MMHG | OXYGEN SATURATION: 85 % | RESPIRATION RATE: 22 BRPM | TEMPERATURE: 97.7 F | SYSTOLIC BLOOD PRESSURE: 159 MMHG | BODY MASS INDEX: 31 KG/M2 | HEIGHT: 67 IN | HEART RATE: 69 BPM | WEIGHT: 197.53 LBS

## 2018-01-01 DIAGNOSIS — J96.21 ACUTE ON CHRONIC RESPIRATORY FAILURE WITH HYPOXIA (HCC): ICD-10-CM

## 2018-01-01 DIAGNOSIS — R65.21 SEPTIC SHOCK (HCC): ICD-10-CM

## 2018-01-01 DIAGNOSIS — J84.10 PULMONARY FIBROSIS (HCC): ICD-10-CM

## 2018-01-01 DIAGNOSIS — R06.03 RESPIRATORY DISTRESS: Primary | ICD-10-CM

## 2018-01-01 DIAGNOSIS — A41.9 SEPTIC SHOCK (HCC): ICD-10-CM

## 2018-01-01 DIAGNOSIS — J18.9 PNEUMONIA: ICD-10-CM

## 2018-01-01 LAB
ANION GAP SERPL CALCULATED.3IONS-SCNC: 3 MMOL/L (ref 4–13)
ANION GAP SERPL CALCULATED.3IONS-SCNC: 7 MMOL/L (ref 4–13)
ANION GAP SERPL CALCULATED.3IONS-SCNC: 9 MMOL/L (ref 4–13)
ARTERIAL PATENCY WRIST A: YES
ATRIAL RATE: 60 BPM
BASE EXCESS BLDA CALC-SCNC: 1.3 MMOL/L
BASOPHILS # BLD AUTO: 0.02 THOUSANDS/ΜL (ref 0–0.1)
BASOPHILS # BLD MANUAL: 0 THOUSAND/UL (ref 0–0.1)
BASOPHILS NFR BLD AUTO: 0 % (ref 0–1)
BASOPHILS NFR MAR MANUAL: 0 % (ref 0–1)
BUN SERPL-MCNC: 37 MG/DL (ref 5–25)
BUN SERPL-MCNC: 54 MG/DL (ref 5–25)
BUN SERPL-MCNC: 64 MG/DL (ref 5–25)
CALCIUM SERPL-MCNC: 8.1 MG/DL (ref 8.3–10.1)
CALCIUM SERPL-MCNC: 8.7 MG/DL (ref 8.3–10.1)
CALCIUM SERPL-MCNC: 8.9 MG/DL (ref 8.3–10.1)
CHLORIDE SERPL-SCNC: 102 MMOL/L (ref 100–108)
CHLORIDE SERPL-SCNC: 103 MMOL/L (ref 100–108)
CHLORIDE SERPL-SCNC: 92 MMOL/L (ref 100–108)
CO2 SERPL-SCNC: 24 MMOL/L (ref 21–32)
CO2 SERPL-SCNC: 31 MMOL/L (ref 21–32)
CO2 SERPL-SCNC: 32 MMOL/L (ref 21–32)
CREAT SERPL-MCNC: 0.77 MG/DL (ref 0.6–1.3)
CREAT SERPL-MCNC: 1.3 MG/DL (ref 0.6–1.3)
CREAT SERPL-MCNC: 1.35 MG/DL (ref 0.6–1.3)
EOSINOPHIL # BLD AUTO: 0 THOUSAND/ΜL (ref 0–0.61)
EOSINOPHIL # BLD MANUAL: 0 THOUSAND/UL (ref 0–0.4)
EOSINOPHIL NFR BLD AUTO: 0 % (ref 0–6)
EOSINOPHIL NFR BLD MANUAL: 0 % (ref 0–6)
ERYTHROCYTE [DISTWIDTH] IN BLOOD BY AUTOMATED COUNT: 13.5 % (ref 11.6–15.1)
ERYTHROCYTE [DISTWIDTH] IN BLOOD BY AUTOMATED COUNT: 13.6 % (ref 11.6–15.1)
ERYTHROCYTE [DISTWIDTH] IN BLOOD BY AUTOMATED COUNT: 13.9 % (ref 11.6–15.1)
FLUAV AG SPEC QL: DETECTED
FLUBV AG SPEC QL: ABNORMAL
GFR SERPL CREATININE-BSD FRML MDRD: 36 ML/MIN/1.73SQ M
GFR SERPL CREATININE-BSD FRML MDRD: 37 ML/MIN/1.73SQ M
GFR SERPL CREATININE-BSD FRML MDRD: 70 ML/MIN/1.73SQ M
GLUCOSE SERPL-MCNC: 109 MG/DL (ref 65–140)
GLUCOSE SERPL-MCNC: 118 MG/DL (ref 65–140)
GLUCOSE SERPL-MCNC: 178 MG/DL (ref 65–140)
GLUCOSE SERPL-MCNC: 191 MG/DL (ref 65–140)
HCO3 BLDA-SCNC: 31.4 MMOL/L (ref 22–28)
HCT VFR BLD AUTO: 30.2 % (ref 34.8–46.1)
HCT VFR BLD AUTO: 32.9 % (ref 34.8–46.1)
HCT VFR BLD AUTO: 35.1 % (ref 34.8–46.1)
HFNC FLOW LPM: 60
HGB BLD-MCNC: 10.4 G/DL (ref 11.5–15.4)
HGB BLD-MCNC: 11.4 G/DL (ref 11.5–15.4)
HGB BLD-MCNC: 9.7 G/DL (ref 11.5–15.4)
L PNEUMO1 AG UR QL IA.RAPID: NEGATIVE
LACTATE SERPL-SCNC: 0.7 MMOL/L (ref 0.5–2)
LYMPHOCYTES # BLD AUTO: 0.14 THOUSAND/UL (ref 0.6–4.47)
LYMPHOCYTES # BLD AUTO: 1.56 THOUSANDS/ΜL (ref 0.6–4.47)
LYMPHOCYTES # BLD AUTO: 2 % (ref 14–44)
LYMPHOCYTES NFR BLD AUTO: 26 % (ref 14–44)
MCH RBC QN AUTO: 28.7 PG (ref 26.8–34.3)
MCH RBC QN AUTO: 28.8 PG (ref 26.8–34.3)
MCH RBC QN AUTO: 28.9 PG (ref 26.8–34.3)
MCHC RBC AUTO-ENTMCNC: 31.6 G/DL (ref 31.4–37.4)
MCHC RBC AUTO-ENTMCNC: 32.1 G/DL (ref 31.4–37.4)
MCHC RBC AUTO-ENTMCNC: 32.5 G/DL (ref 31.4–37.4)
MCV RBC AUTO: 89 FL (ref 82–98)
MCV RBC AUTO: 89 FL (ref 82–98)
MCV RBC AUTO: 91 FL (ref 82–98)
MONOCYTES # BLD AUTO: 0.21 THOUSAND/UL (ref 0–1.22)
MONOCYTES # BLD AUTO: 0.57 THOUSAND/ΜL (ref 0.17–1.22)
MONOCYTES NFR BLD AUTO: 10 % (ref 4–12)
MONOCYTES NFR BLD: 3 % (ref 4–12)
NEUTROPHILS # BLD AUTO: 3.78 THOUSANDS/ΜL (ref 1.85–7.62)
NEUTROPHILS # BLD MANUAL: 6.7 THOUSAND/UL (ref 1.85–7.62)
NEUTS BAND NFR BLD MANUAL: 4 % (ref 0–8)
NEUTS SEG NFR BLD AUTO: 64 % (ref 43–75)
NEUTS SEG NFR BLD AUTO: 91 % (ref 43–75)
NON VENT HFNC FIO2: 65
NON VENT TYPE HFNC: ABNORMAL
NRBC BLD AUTO-RTO: 0 /100 WBCS
NRBC BLD AUTO-RTO: 0 /100 WBCS
NRBC BLD AUTO-RTO: 1 /100 WBC (ref 0–2)
O2 CT BLDA-SCNC: 14.8 ML/DL (ref 16–23)
OXYHGB MFR BLDA: 90.2 % (ref 94–97)
P AXIS: 107 DEGREES
PCO2 BLDA: 84.6 MM HG (ref 36–44)
PH BLDA: 7.19 [PH] (ref 7.35–7.45)
PLATELET # BLD AUTO: 139 THOUSANDS/UL (ref 149–390)
PLATELET # BLD AUTO: 167 THOUSANDS/UL (ref 149–390)
PLATELET # BLD AUTO: 196 THOUSANDS/UL (ref 149–390)
PLATELET BLD QL SMEAR: ADEQUATE
PMV BLD AUTO: 10 FL (ref 8.9–12.7)
PMV BLD AUTO: 10.1 FL (ref 8.9–12.7)
PMV BLD AUTO: 10.4 FL (ref 8.9–12.7)
PO2 BLDA: 70.4 MM HG (ref 75–129)
POTASSIUM SERPL-SCNC: 3.8 MMOL/L (ref 3.5–5.3)
POTASSIUM SERPL-SCNC: 4.4 MMOL/L (ref 3.5–5.3)
POTASSIUM SERPL-SCNC: 4.5 MMOL/L (ref 3.5–5.3)
PR INTERVAL: 174 MS
QRS AXIS: 73 DEGREES
QRSD INTERVAL: 96 MS
QT INTERVAL: 406 MS
QTC INTERVAL: 406 MS
RBC # BLD AUTO: 3.38 MILLION/UL (ref 3.81–5.12)
RBC # BLD AUTO: 3.61 MILLION/UL (ref 3.81–5.12)
RBC # BLD AUTO: 3.95 MILLION/UL (ref 3.81–5.12)
RBC MORPH BLD: NORMAL
RSV B RNA SPEC QL NAA+PROBE: ABNORMAL
S PNEUM AG UR QL: POSITIVE
SODIUM SERPL-SCNC: 131 MMOL/L (ref 136–145)
SODIUM SERPL-SCNC: 135 MMOL/L (ref 136–145)
SODIUM SERPL-SCNC: 137 MMOL/L (ref 136–145)
SPECIMEN SOURCE: ABNORMAL
T WAVE AXIS: 45 DEGREES
TROPONIN I SERPL-MCNC: 0.06 NG/ML
TROPONIN I SERPL-MCNC: 0.2 NG/ML
VENTRICULAR RATE: 60 BPM
WBC # BLD AUTO: 3.18 THOUSAND/UL (ref 4.31–10.16)
WBC # BLD AUTO: 5.95 THOUSAND/UL (ref 4.31–10.16)
WBC # BLD AUTO: 7.05 THOUSAND/UL (ref 4.31–10.16)

## 2018-01-01 PROCEDURE — 82805 BLOOD GASES W/O2 SATURATION: CPT | Performed by: INTERNAL MEDICINE

## 2018-01-01 PROCEDURE — 87798 DETECT AGENT NOS DNA AMP: CPT | Performed by: EMERGENCY MEDICINE

## 2018-01-01 PROCEDURE — 93005 ELECTROCARDIOGRAM TRACING: CPT

## 2018-01-01 PROCEDURE — 99285 EMERGENCY DEPT VISIT HI MDM: CPT

## 2018-01-01 PROCEDURE — 94660 CPAP INITIATION&MGMT: CPT

## 2018-01-01 PROCEDURE — 80048 BASIC METABOLIC PNL TOTAL CA: CPT | Performed by: EMERGENCY MEDICINE

## 2018-01-01 PROCEDURE — 36600 WITHDRAWAL OF ARTERIAL BLOOD: CPT

## 2018-01-01 PROCEDURE — 85027 COMPLETE CBC AUTOMATED: CPT | Performed by: INTERNAL MEDICINE

## 2018-01-01 PROCEDURE — 82948 REAGENT STRIP/BLOOD GLUCOSE: CPT

## 2018-01-01 PROCEDURE — 94640 AIRWAY INHALATION TREATMENT: CPT

## 2018-01-01 PROCEDURE — 36415 COLL VENOUS BLD VENIPUNCTURE: CPT | Performed by: INTERNAL MEDICINE

## 2018-01-01 PROCEDURE — 96367 TX/PROPH/DG ADDL SEQ IV INF: CPT

## 2018-01-01 PROCEDURE — 72125 CT NECK SPINE W/O DYE: CPT

## 2018-01-01 PROCEDURE — 94760 N-INVAS EAR/PLS OXIMETRY 1: CPT

## 2018-01-01 PROCEDURE — 83605 ASSAY OF LACTIC ACID: CPT | Performed by: HOSPITALIST

## 2018-01-01 PROCEDURE — 71045 X-RAY EXAM CHEST 1 VIEW: CPT

## 2018-01-01 PROCEDURE — 84484 ASSAY OF TROPONIN QUANT: CPT | Performed by: HOSPITALIST

## 2018-01-01 PROCEDURE — 93005 ELECTROCARDIOGRAM TRACING: CPT | Performed by: EMERGENCY MEDICINE

## 2018-01-01 PROCEDURE — 96365 THER/PROPH/DIAG IV INF INIT: CPT

## 2018-01-01 PROCEDURE — 36415 COLL VENOUS BLD VENIPUNCTURE: CPT | Performed by: EMERGENCY MEDICINE

## 2018-01-01 PROCEDURE — 87040 BLOOD CULTURE FOR BACTERIA: CPT | Performed by: EMERGENCY MEDICINE

## 2018-01-01 PROCEDURE — TCMNV: Performed by: INTERNAL MEDICINE

## 2018-01-01 PROCEDURE — 85025 COMPLETE CBC W/AUTO DIFF WBC: CPT | Performed by: EMERGENCY MEDICINE

## 2018-01-01 PROCEDURE — 87449 NOS EACH ORGANISM AG IA: CPT | Performed by: INTERNAL MEDICINE

## 2018-01-01 PROCEDURE — 80048 BASIC METABOLIC PNL TOTAL CA: CPT | Performed by: INTERNAL MEDICINE

## 2018-01-01 PROCEDURE — 84484 ASSAY OF TROPONIN QUANT: CPT | Performed by: EMERGENCY MEDICINE

## 2018-01-01 PROCEDURE — 70450 CT HEAD/BRAIN W/O DYE: CPT

## 2018-01-01 PROCEDURE — 85007 BL SMEAR W/DIFF WBC COUNT: CPT | Performed by: INTERNAL MEDICINE

## 2018-01-01 RX ORDER — SODIUM CHLORIDE FOR INHALATION 0.9 %
3 VIAL, NEBULIZER (ML) INHALATION EVERY 6 HOURS PRN
Status: DISCONTINUED | OUTPATIENT
Start: 2018-01-01 | End: 2018-01-01 | Stop reason: HOSPADM

## 2018-01-01 RX ORDER — SODIUM CHLORIDE FOR INHALATION 0.9 %
3 VIAL, NEBULIZER (ML) INHALATION
Status: DISCONTINUED | OUTPATIENT
Start: 2018-01-01 | End: 2018-01-01 | Stop reason: HOSPADM

## 2018-01-01 RX ORDER — AZITHROMYCIN 250 MG/1
500 TABLET, FILM COATED ORAL EVERY 24 HOURS
Status: DISCONTINUED | OUTPATIENT
Start: 2018-01-01 | End: 2018-01-01

## 2018-01-01 RX ORDER — AMLODIPINE BESYLATE 5 MG/1
5 TABLET ORAL EVERY 12 HOURS SCHEDULED
Status: DISCONTINUED | OUTPATIENT
Start: 2018-01-01 | End: 2018-01-01 | Stop reason: HOSPADM

## 2018-01-01 RX ORDER — METHYLPREDNISOLONE SODIUM SUCCINATE 40 MG/ML
40 INJECTION, POWDER, LYOPHILIZED, FOR SOLUTION INTRAMUSCULAR; INTRAVENOUS EVERY 6 HOURS SCHEDULED
Status: DISCONTINUED | OUTPATIENT
Start: 2018-01-01 | End: 2018-01-01 | Stop reason: HOSPADM

## 2018-01-01 RX ORDER — FUROSEMIDE 10 MG/ML
20 INJECTION INTRAMUSCULAR; INTRAVENOUS ONCE
Status: DISCONTINUED | OUTPATIENT
Start: 2018-01-01 | End: 2018-01-01

## 2018-01-01 RX ORDER — CHOLECALCIFEROL (VITAMIN D3) 10 MCG
1 TABLET ORAL DAILY
Status: DISCONTINUED | OUTPATIENT
Start: 2018-01-01 | End: 2018-01-01 | Stop reason: HOSPADM

## 2018-01-01 RX ORDER — AMLODIPINE BESYLATE 5 MG/1
5 TABLET ORAL 2 TIMES DAILY
COMMUNITY

## 2018-01-01 RX ORDER — LEVALBUTEROL 1.25 MG/.5ML
1.25 SOLUTION, CONCENTRATE RESPIRATORY (INHALATION)
Status: DISCONTINUED | OUTPATIENT
Start: 2018-01-01 | End: 2018-01-01 | Stop reason: HOSPADM

## 2018-01-01 RX ORDER — HEPARIN SODIUM 5000 [USP'U]/ML
5000 INJECTION, SOLUTION INTRAVENOUS; SUBCUTANEOUS EVERY 8 HOURS SCHEDULED
Status: DISCONTINUED | OUTPATIENT
Start: 2018-01-01 | End: 2018-01-01 | Stop reason: HOSPADM

## 2018-01-01 RX ORDER — NITROGLYCERIN 0.4 MG/1
0.4 TABLET SUBLINGUAL
Status: DISCONTINUED | OUTPATIENT
Start: 2018-01-01 | End: 2018-01-01 | Stop reason: HOSPADM

## 2018-01-01 RX ORDER — ATORVASTATIN CALCIUM 40 MG/1
40 TABLET, FILM COATED ORAL DAILY
COMMUNITY

## 2018-01-01 RX ORDER — CLOPIDOGREL BISULFATE 75 MG/1
75 TABLET ORAL DAILY
COMMUNITY

## 2018-01-01 RX ORDER — SODIUM CHLORIDE FOR INHALATION 0.9 %
VIAL, NEBULIZER (ML) INHALATION
Status: DISPENSED
Start: 2018-01-01 | End: 2018-01-01

## 2018-01-01 RX ORDER — NITROGLYCERIN 0.4 MG/1
0.4 TABLET SUBLINGUAL
COMMUNITY

## 2018-01-01 RX ORDER — CLOPIDOGREL BISULFATE 75 MG/1
75 TABLET ORAL DAILY
Status: DISCONTINUED | OUTPATIENT
Start: 2018-01-01 | End: 2018-01-01 | Stop reason: HOSPADM

## 2018-01-01 RX ORDER — ALPRAZOLAM 0.25 MG/1
0.25 TABLET ORAL 3 TIMES DAILY PRN
Status: DISCONTINUED | OUTPATIENT
Start: 2018-01-01 | End: 2018-01-01 | Stop reason: HOSPADM

## 2018-01-01 RX ORDER — LORAZEPAM 2 MG/ML
0.5 INJECTION INTRAMUSCULAR EVERY 6 HOURS PRN
Status: DISCONTINUED | OUTPATIENT
Start: 2018-01-01 | End: 2018-01-01 | Stop reason: HOSPADM

## 2018-01-01 RX ORDER — MORPHINE SULFATE 2 MG/ML
1 INJECTION, SOLUTION INTRAMUSCULAR; INTRAVENOUS
Status: DISCONTINUED | OUTPATIENT
Start: 2018-01-01 | End: 2018-01-01

## 2018-01-01 RX ORDER — FUROSEMIDE 10 MG/ML
40 INJECTION INTRAMUSCULAR; INTRAVENOUS ONCE
Status: COMPLETED | OUTPATIENT
Start: 2018-01-01 | End: 2018-01-01

## 2018-01-01 RX ORDER — NADOLOL 20 MG/1
20 TABLET ORAL EVERY 12 HOURS SCHEDULED
Status: DISCONTINUED | OUTPATIENT
Start: 2018-01-01 | End: 2018-01-01 | Stop reason: HOSPADM

## 2018-01-01 RX ORDER — NADOLOL 20 MG/1
20 TABLET ORAL 2 TIMES DAILY
COMMUNITY

## 2018-01-01 RX ORDER — FUROSEMIDE 10 MG/ML
40 INJECTION INTRAMUSCULAR; INTRAVENOUS
Status: COMPLETED | OUTPATIENT
Start: 2018-01-01 | End: 2018-01-01

## 2018-01-01 RX ORDER — HYDRALAZINE HYDROCHLORIDE 20 MG/ML
5 INJECTION INTRAMUSCULAR; INTRAVENOUS EVERY 6 HOURS PRN
Status: DISCONTINUED | OUTPATIENT
Start: 2018-01-01 | End: 2018-01-01 | Stop reason: HOSPADM

## 2018-01-01 RX ORDER — LANOLIN ALCOHOL/MO/W.PET/CERES
3 CREAM (GRAM) TOPICAL
Status: DISCONTINUED | OUTPATIENT
Start: 2018-01-01 | End: 2018-01-01 | Stop reason: HOSPADM

## 2018-01-01 RX ORDER — ACETAMINOPHEN 325 MG/1
650 TABLET ORAL EVERY 6 HOURS PRN
Status: DISCONTINUED | OUTPATIENT
Start: 2018-01-01 | End: 2018-01-01 | Stop reason: HOSPADM

## 2018-01-01 RX ORDER — CHOLECALCIFEROL (VITAMIN D3) 10 MCG
1 TABLET ORAL DAILY
COMMUNITY

## 2018-01-01 RX ORDER — LORAZEPAM 2 MG/ML
0.5 INJECTION INTRAMUSCULAR EVERY 4 HOURS PRN
Status: DISCONTINUED | OUTPATIENT
Start: 2018-01-01 | End: 2018-01-01

## 2018-01-01 RX ORDER — GUAIFENESIN 600 MG
600 TABLET, EXTENDED RELEASE 12 HR ORAL EVERY 12 HOURS SCHEDULED
Status: DISCONTINUED | OUTPATIENT
Start: 2018-01-01 | End: 2018-01-01 | Stop reason: HOSPADM

## 2018-01-01 RX ORDER — SODIUM CHLORIDE 9 MG/ML
50 INJECTION, SOLUTION INTRAVENOUS CONTINUOUS
Status: DISCONTINUED | OUTPATIENT
Start: 2018-01-01 | End: 2018-01-01

## 2018-01-01 RX ORDER — LEVALBUTEROL 1.25 MG/.5ML
1.25 SOLUTION, CONCENTRATE RESPIRATORY (INHALATION) EVERY 6 HOURS PRN
Status: DISCONTINUED | OUTPATIENT
Start: 2018-01-01 | End: 2018-01-01 | Stop reason: HOSPADM

## 2018-01-01 RX ORDER — OSELTAMIVIR PHOSPHATE 30 MG/1
30 CAPSULE ORAL EVERY 12 HOURS SCHEDULED
Status: DISCONTINUED | OUTPATIENT
Start: 2018-01-01 | End: 2018-01-01 | Stop reason: HOSPADM

## 2018-01-01 RX ORDER — ASPIRIN 81 MG/1
324 TABLET, CHEWABLE ORAL ONCE
Status: COMPLETED | OUTPATIENT
Start: 2018-01-01 | End: 2018-01-01

## 2018-01-01 RX ORDER — AMOXICILLIN 250 MG
1 CAPSULE ORAL DAILY PRN
Status: DISCONTINUED | OUTPATIENT
Start: 2018-01-01 | End: 2018-01-01 | Stop reason: HOSPADM

## 2018-01-01 RX ORDER — ATORVASTATIN CALCIUM 40 MG/1
40 TABLET, FILM COATED ORAL 3 TIMES WEEKLY
Status: DISCONTINUED | OUTPATIENT
Start: 2018-01-01 | End: 2018-01-01 | Stop reason: HOSPADM

## 2018-01-01 RX ORDER — PREDNISOLONE ACETATE 10 MG/ML
1 SUSPENSION/ DROPS OPHTHALMIC 4 TIMES DAILY
Status: DISCONTINUED | OUTPATIENT
Start: 2018-01-01 | End: 2018-01-01 | Stop reason: HOSPADM

## 2018-01-01 RX ORDER — ALBUTEROL SULFATE 2.5 MG/3ML
5 SOLUTION RESPIRATORY (INHALATION) ONCE
Status: COMPLETED | OUTPATIENT
Start: 2018-01-01 | End: 2018-01-01

## 2018-01-01 RX ORDER — CHOLECALCIFEROL (VITAMIN D3) 125 MCG
100 CAPSULE ORAL DAILY
Status: DISCONTINUED | OUTPATIENT
Start: 2018-01-01 | End: 2018-01-01 | Stop reason: HOSPADM

## 2018-01-01 RX ORDER — METHYLPREDNISOLONE SODIUM SUCCINATE 40 MG/ML
40 INJECTION, POWDER, LYOPHILIZED, FOR SOLUTION INTRAMUSCULAR; INTRAVENOUS EVERY 12 HOURS SCHEDULED
Status: DISCONTINUED | OUTPATIENT
Start: 2018-01-01 | End: 2018-01-01

## 2018-01-01 RX ORDER — OXYCODONE HCL 5 MG/5 ML
2.5 SOLUTION, ORAL ORAL EVERY 4 HOURS PRN
Status: DISCONTINUED | OUTPATIENT
Start: 2018-01-01 | End: 2018-01-01 | Stop reason: HOSPADM

## 2018-01-01 RX ADMIN — METHYLPREDNISOLONE SODIUM SUCCINATE 40 MG: 40 INJECTION, POWDER, FOR SOLUTION INTRAMUSCULAR; INTRAVENOUS at 01:04

## 2018-01-01 RX ADMIN — ISODIUM CHLORIDE 3 ML: 0.03 SOLUTION RESPIRATORY (INHALATION) at 07:19

## 2018-01-01 RX ADMIN — HYDRALAZINE HYDROCHLORIDE 5 MG: 20 INJECTION INTRAMUSCULAR; INTRAVENOUS at 04:09

## 2018-01-01 RX ADMIN — CEFEPIME HYDROCHLORIDE 2000 MG: 1 INJECTION, POWDER, FOR SOLUTION INTRAMUSCULAR; INTRAVENOUS at 15:11

## 2018-01-01 RX ADMIN — METHYLPREDNISOLONE SODIUM SUCCINATE 40 MG: 40 INJECTION, POWDER, FOR SOLUTION INTRAMUSCULAR; INTRAVENOUS at 01:11

## 2018-01-01 RX ADMIN — HEPARIN SODIUM 5000 UNITS: 5000 INJECTION, SOLUTION INTRAVENOUS; SUBCUTANEOUS at 21:49

## 2018-01-01 RX ADMIN — HEPARIN SODIUM 5000 UNITS: 5000 INJECTION, SOLUTION INTRAVENOUS; SUBCUTANEOUS at 13:40

## 2018-01-01 RX ADMIN — MELATONIN TAB 3 MG 3 MG: 3 TAB at 21:49

## 2018-01-01 RX ADMIN — METHYLPREDNISOLONE SODIUM SUCCINATE 40 MG: 40 INJECTION, POWDER, FOR SOLUTION INTRAMUSCULAR; INTRAVENOUS at 06:38

## 2018-01-01 RX ADMIN — FUROSEMIDE 40 MG: 10 INJECTION, SOLUTION INTRAMUSCULAR; INTRAVENOUS at 18:02

## 2018-01-01 RX ADMIN — ISODIUM CHLORIDE 3 ML: 0.03 SOLUTION RESPIRATORY (INHALATION) at 20:49

## 2018-01-01 RX ADMIN — Medication 100 MG: at 09:26

## 2018-01-01 RX ADMIN — GUAIFENESIN 600 MG: 600 TABLET, EXTENDED RELEASE ORAL at 22:47

## 2018-01-01 RX ADMIN — ISODIUM CHLORIDE 3 ML: 0.03 SOLUTION RESPIRATORY (INHALATION) at 13:46

## 2018-01-01 RX ADMIN — AMLODIPINE BESYLATE 5 MG: 5 TABLET ORAL at 22:48

## 2018-01-01 RX ADMIN — NADOLOL 20 MG: 20 TABLET ORAL at 22:49

## 2018-01-01 RX ADMIN — CLOPIDOGREL BISULFATE 75 MG: 75 TABLET ORAL at 13:58

## 2018-01-01 RX ADMIN — METHYLPREDNISOLONE SODIUM SUCCINATE 40 MG: 40 INJECTION, POWDER, FOR SOLUTION INTRAMUSCULAR; INTRAVENOUS at 12:00

## 2018-01-01 RX ADMIN — CEFTRIAXONE 1000 MG: 1 INJECTION, POWDER, FOR SOLUTION INTRAMUSCULAR; INTRAVENOUS at 09:26

## 2018-01-01 RX ADMIN — ALBUTEROL SULFATE 5 MG: 2.5 SOLUTION RESPIRATORY (INHALATION) at 14:07

## 2018-01-01 RX ADMIN — NEPHROCAP 1 CAPSULE: 1 CAP ORAL at 13:58

## 2018-01-01 RX ADMIN — METHYLPREDNISOLONE SODIUM SUCCINATE 40 MG: 40 INJECTION, POWDER, FOR SOLUTION INTRAMUSCULAR; INTRAVENOUS at 17:39

## 2018-01-01 RX ADMIN — ISODIUM CHLORIDE 3 ML: 0.03 SOLUTION RESPIRATORY (INHALATION) at 19:02

## 2018-01-01 RX ADMIN — AMLODIPINE BESYLATE 5 MG: 5 TABLET ORAL at 21:49

## 2018-01-01 RX ADMIN — AMLODIPINE BESYLATE 5 MG: 5 TABLET ORAL at 09:21

## 2018-01-01 RX ADMIN — OSELTAMIVIR PHOSPHATE 30 MG: 30 CAPSULE ORAL at 09:27

## 2018-01-01 RX ADMIN — NADOLOL 20 MG: 20 TABLET ORAL at 09:27

## 2018-01-01 RX ADMIN — CEFTRIAXONE 2000 MG: 2 INJECTION, SOLUTION INTRAVENOUS at 10:01

## 2018-01-01 RX ADMIN — NADOLOL 20 MG: 20 TABLET ORAL at 21:50

## 2018-01-01 RX ADMIN — SODIUM CHLORIDE 50 ML/HR: 0.9 INJECTION, SOLUTION INTRAVENOUS at 19:09

## 2018-01-01 RX ADMIN — GUAIFENESIN 600 MG: 600 TABLET, EXTENDED RELEASE ORAL at 09:22

## 2018-01-01 RX ADMIN — METHYLPREDNISOLONE SODIUM SUCCINATE 40 MG: 40 INJECTION, POWDER, FOR SOLUTION INTRAMUSCULAR; INTRAVENOUS at 18:02

## 2018-01-01 RX ADMIN — ISODIUM CHLORIDE 3 ML: 0.03 SOLUTION RESPIRATORY (INHALATION) at 19:04

## 2018-01-01 RX ADMIN — MELATONIN TAB 3 MG 3 MG: 3 TAB at 22:48

## 2018-01-01 RX ADMIN — SODIUM CHLORIDE 50 ML/HR: 0.9 INJECTION, SOLUTION INTRAVENOUS at 10:20

## 2018-01-01 RX ADMIN — SODIUM CHLORIDE 1000 ML: 0.9 INJECTION, SOLUTION INTRAVENOUS at 22:30

## 2018-01-01 RX ADMIN — ALPRAZOLAM 0.25 MG: 0.25 TABLET ORAL at 21:12

## 2018-01-01 RX ADMIN — OSELTAMIVIR PHOSPHATE 30 MG: 30 CAPSULE ORAL at 13:58

## 2018-01-01 RX ADMIN — ALPRAZOLAM 0.25 MG: 0.25 TABLET ORAL at 04:20

## 2018-01-01 RX ADMIN — LORAZEPAM 0.5 MG: 2 INJECTION INTRAMUSCULAR; INTRAVENOUS at 08:25

## 2018-01-01 RX ADMIN — IPRATROPIUM BROMIDE 0.5 MG: 0.5 SOLUTION RESPIRATORY (INHALATION) at 14:07

## 2018-01-01 RX ADMIN — ISODIUM CHLORIDE 3 ML: 0.03 SOLUTION RESPIRATORY (INHALATION) at 13:31

## 2018-01-01 RX ADMIN — HEPARIN SODIUM 5000 UNITS: 5000 INJECTION, SOLUTION INTRAVENOUS; SUBCUTANEOUS at 05:56

## 2018-01-01 RX ADMIN — VANCOMYCIN HYDROCHLORIDE 1250 MG: 1 INJECTION, POWDER, LYOPHILIZED, FOR SOLUTION INTRAVENOUS at 16:00

## 2018-01-01 RX ADMIN — ALPRAZOLAM 0.25 MG: 0.25 TABLET ORAL at 18:02

## 2018-01-01 RX ADMIN — LEVALBUTEROL HYDROCHLORIDE 1.25 MG: 1.25 SOLUTION, CONCENTRATE RESPIRATORY (INHALATION) at 12:52

## 2018-01-01 RX ADMIN — OSELTAMIVIR PHOSPHATE 30 MG: 30 CAPSULE ORAL at 21:51

## 2018-01-01 RX ADMIN — HEPARIN SODIUM 5000 UNITS: 5000 INJECTION, SOLUTION INTRAVENOUS; SUBCUTANEOUS at 05:55

## 2018-01-01 RX ADMIN — LEVALBUTEROL HYDROCHLORIDE 1.25 MG: 1.25 SOLUTION, CONCENTRATE RESPIRATORY (INHALATION) at 19:04

## 2018-01-01 RX ADMIN — ASPIRIN 81 MG 324 MG: 81 TABLET ORAL at 14:45

## 2018-01-01 RX ADMIN — CLOPIDOGREL BISULFATE 75 MG: 75 TABLET ORAL at 09:23

## 2018-01-01 RX ADMIN — OSELTAMIVIR PHOSPHATE 30 MG: 30 CAPSULE ORAL at 21:13

## 2018-01-01 RX ADMIN — HEPARIN SODIUM 5000 UNITS: 5000 INJECTION, SOLUTION INTRAVENOUS; SUBCUTANEOUS at 21:12

## 2018-01-01 RX ADMIN — LEVALBUTEROL HYDROCHLORIDE 1.25 MG: 1.25 SOLUTION, CONCENTRATE RESPIRATORY (INHALATION) at 13:46

## 2018-01-01 RX ADMIN — ALPRAZOLAM 0.25 MG: 0.25 TABLET ORAL at 05:55

## 2018-01-01 RX ADMIN — FUROSEMIDE 40 MG: 10 INJECTION, SOLUTION INTRAMUSCULAR; INTRAVENOUS at 08:57

## 2018-01-01 RX ADMIN — HEPARIN SODIUM 5000 UNITS: 5000 INJECTION, SOLUTION INTRAVENOUS; SUBCUTANEOUS at 15:00

## 2018-01-01 RX ADMIN — CLOPIDOGREL BISULFATE 75 MG: 75 TABLET ORAL at 09:26

## 2018-01-01 RX ADMIN — NEPHROCAP 1 CAPSULE: 1 CAP ORAL at 09:23

## 2018-01-01 RX ADMIN — HEPARIN SODIUM 5000 UNITS: 5000 INJECTION, SOLUTION INTRAVENOUS; SUBCUTANEOUS at 05:28

## 2018-01-01 RX ADMIN — AMLODIPINE BESYLATE 5 MG: 5 TABLET ORAL at 21:12

## 2018-01-01 RX ADMIN — LEVALBUTEROL HYDROCHLORIDE 1.25 MG: 1.25 SOLUTION, CONCENTRATE RESPIRATORY (INHALATION) at 07:39

## 2018-01-01 RX ADMIN — CEFTRIAXONE 2000 MG: 2 INJECTION, SOLUTION INTRAVENOUS at 09:31

## 2018-01-01 RX ADMIN — LEVALBUTEROL HYDROCHLORIDE 1.25 MG: 1.25 SOLUTION, CONCENTRATE RESPIRATORY (INHALATION) at 13:31

## 2018-01-01 RX ADMIN — ATORVASTATIN CALCIUM 40 MG: 40 TABLET, FILM COATED ORAL at 13:58

## 2018-01-01 RX ADMIN — METHYLPREDNISOLONE SODIUM SUCCINATE 40 MG: 40 INJECTION, POWDER, FOR SOLUTION INTRAMUSCULAR; INTRAVENOUS at 05:52

## 2018-01-01 RX ADMIN — SODIUM CHLORIDE 50 ML/HR: 0.9 INJECTION, SOLUTION INTRAVENOUS at 13:40

## 2018-01-01 RX ADMIN — SODIUM CHLORIDE 50 ML/HR: 0.9 INJECTION, SOLUTION INTRAVENOUS at 15:02

## 2018-01-01 RX ADMIN — ISODIUM CHLORIDE 3 ML: 0.03 SOLUTION RESPIRATORY (INHALATION) at 12:52

## 2018-01-01 RX ADMIN — AZITHROMYCIN MONOHYDRATE 500 MG: 500 INJECTION, POWDER, LYOPHILIZED, FOR SOLUTION INTRAVENOUS at 21:12

## 2018-01-01 RX ADMIN — HEPARIN SODIUM 5000 UNITS: 5000 INJECTION, SOLUTION INTRAVENOUS; SUBCUTANEOUS at 06:38

## 2018-01-01 RX ADMIN — ALPRAZOLAM 0.25 MG: 0.25 TABLET ORAL at 06:37

## 2018-01-01 RX ADMIN — METHYLPREDNISOLONE SODIUM SUCCINATE 40 MG: 40 INJECTION, POWDER, FOR SOLUTION INTRAMUSCULAR; INTRAVENOUS at 21:12

## 2018-01-01 RX ADMIN — ISODIUM CHLORIDE 3 ML: 0.03 SOLUTION RESPIRATORY (INHALATION) at 07:14

## 2018-01-01 RX ADMIN — OSELTAMIVIR PHOSPHATE 30 MG: 30 CAPSULE ORAL at 22:48

## 2018-01-01 RX ADMIN — LEVALBUTEROL HYDROCHLORIDE 1.25 MG: 1.25 SOLUTION, CONCENTRATE RESPIRATORY (INHALATION) at 20:49

## 2018-01-01 RX ADMIN — MELATONIN TAB 3 MG 3 MG: 3 TAB at 21:19

## 2018-01-01 RX ADMIN — NADOLOL 20 MG: 20 TABLET ORAL at 00:58

## 2018-01-01 RX ADMIN — HEPARIN SODIUM 5000 UNITS: 5000 INJECTION, SOLUTION INTRAVENOUS; SUBCUTANEOUS at 15:01

## 2018-01-01 RX ADMIN — LEVALBUTEROL HYDROCHLORIDE 1.25 MG: 1.25 SOLUTION, CONCENTRATE RESPIRATORY (INHALATION) at 07:19

## 2018-01-01 RX ADMIN — LEVALBUTEROL HYDROCHLORIDE 1.25 MG: 1.25 SOLUTION, CONCENTRATE RESPIRATORY (INHALATION) at 09:39

## 2018-01-01 RX ADMIN — AMLODIPINE BESYLATE 5 MG: 5 TABLET ORAL at 09:26

## 2018-01-01 RX ADMIN — PREDNISOLONE ACETATE 1 DROP: 10 SUSPENSION/ DROPS OPHTHALMIC at 17:39

## 2018-01-01 RX ADMIN — ALPRAZOLAM 0.25 MG: 0.25 TABLET ORAL at 21:49

## 2018-01-01 RX ADMIN — LEVALBUTEROL HYDROCHLORIDE 1.25 MG: 1.25 SOLUTION, CONCENTRATE RESPIRATORY (INHALATION) at 19:01

## 2018-01-01 RX ADMIN — GUAIFENESIN 600 MG: 600 TABLET, EXTENDED RELEASE ORAL at 21:12

## 2018-01-01 RX ADMIN — FUROSEMIDE 40 MG: 10 INJECTION, SOLUTION INTRAMUSCULAR; INTRAVENOUS at 09:25

## 2018-01-01 RX ADMIN — GUAIFENESIN 600 MG: 600 TABLET, EXTENDED RELEASE ORAL at 09:26

## 2018-01-01 RX ADMIN — SODIUM CHLORIDE 1000 ML: 0.9 INJECTION, SOLUTION INTRAVENOUS at 23:02

## 2018-01-01 RX ADMIN — ISODIUM CHLORIDE 3 ML: 0.03 SOLUTION RESPIRATORY (INHALATION) at 09:39

## 2018-01-01 RX ADMIN — ISODIUM CHLORIDE 3 ML: 0.03 SOLUTION RESPIRATORY (INHALATION) at 19:25

## 2018-01-01 RX ADMIN — AZITHROMYCIN MONOHYDRATE 500 MG: 500 INJECTION, POWDER, LYOPHILIZED, FOR SOLUTION INTRAVENOUS at 21:48

## 2018-01-01 RX ADMIN — Medication 100 MG: at 13:58

## 2018-01-01 RX ADMIN — Medication 100 MG: at 09:22

## 2018-01-01 RX ADMIN — HEPARIN SODIUM 5000 UNITS: 5000 INJECTION, SOLUTION INTRAVENOUS; SUBCUTANEOUS at 21:33

## 2018-01-01 RX ADMIN — ALPRAZOLAM 0.25 MG: 0.25 TABLET ORAL at 22:48

## 2018-01-01 RX ADMIN — ALPRAZOLAM 0.25 MG: 0.25 TABLET ORAL at 16:02

## 2018-01-01 RX ADMIN — NEPHROCAP 1 CAPSULE: 1 CAP ORAL at 09:26

## 2018-01-01 RX ADMIN — ALPRAZOLAM 0.25 MG: 0.25 TABLET ORAL at 16:32

## 2018-01-01 RX ADMIN — GUAIFENESIN 600 MG: 600 TABLET, EXTENDED RELEASE ORAL at 21:49

## 2018-01-01 RX ADMIN — AZITHROMYCIN MONOHYDRATE 500 MG: 500 INJECTION, POWDER, LYOPHILIZED, FOR SOLUTION INTRAVENOUS at 21:33

## 2018-01-01 RX ADMIN — NADOLOL 20 MG: 20 TABLET ORAL at 09:22

## 2018-01-01 RX ADMIN — PREDNISOLONE ACETATE 1 DROP: 10 SUSPENSION/ DROPS OPHTHALMIC at 22:30

## 2018-01-01 RX ADMIN — ISODIUM CHLORIDE 3 ML: 0.03 SOLUTION RESPIRATORY (INHALATION) at 07:40

## 2018-01-01 RX ADMIN — LEVALBUTEROL HYDROCHLORIDE 1.25 MG: 1.25 SOLUTION, CONCENTRATE RESPIRATORY (INHALATION) at 19:25

## 2018-01-01 RX ADMIN — LEVALBUTEROL HYDROCHLORIDE 1.25 MG: 1.25 SOLUTION, CONCENTRATE RESPIRATORY (INHALATION) at 07:14

## 2018-01-12 NOTE — PROGRESS NOTES
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)    Plan   Anxiety disorder due to general medical condition    · Follow-up visit in 6 months Evaluation and Treatment  Follow-up  Status: Hold For - Scheduling     Requested for: 95GLI3653  Chronic hypoxemic respiratory failure, Pulmonary fibrosis    · *1 - [facility]  *PULM PALS  Status: Hold For - Scheduling  Requested    for: 32RXF9309  Care Summary provided  : Yes    Discussion/Summary   Discussion Summary:     pulmonary fibrosis with chronic resp failure - on chronic 6L oxygen with a concentrator  She declines the influenza vaccine, is up to date with pneumonia series  Patient agreeable to be seen by [facility] PALS  anxiety - patient doing well on branded xanax, will refill as needed  essential HTN with CAD - stable on amlodipine 5mg bid, candesartan 16mg daily and nadolol 20mg bid  May need decreased therapy if she starts losing weight  prediabetes - A1c at 5 8  CAD with h/o MI - asymptomatic  She remains on statin, B-blocker and Nitro prn  h/o stroke with residual left vision loss - on modified dosing of statin  R retinal artery occlusion s/p photocoag -with vision loss  Followed by Dr Luis Pink  Medication SE Review and Pt Understands Tx: Possible side effects of new medications were reviewed with the patient/guardian today  The treatment plan was reviewed with the patient/guardian  The patient/guardian understands and agrees with the treatment plan      Chief Complaint   Chief Complaint Chronic Condition [facility]: Patient is here today for follow up of chronic conditions described in HPI        History of Present Illness   HPI: 81yo female with pulmonary fibrosis on chronic oxygen 5L, HTN, CAD with h/o MI, stroke with residual left vision loss, R retinal artery occlusion s/p photocoag and anxiety here for follow up care  She is a resident at [facility] and has no plans to move to [employer]  continuous oxygen 6L at home  by Palliative Care, placed on morphine, developed rib pain and then it was discontinued  She was then given alprazolam, reports she became loopy  She requested for brand name only which has provided relief of her anxiety  She normally takes 0 25mg 1/2 tab bid  has had decreased appetite, she started align due to loose stools every other week  Hypertension (Follow-Up): The patient presents for follow-up of essential hypertension  The patient states she has been stable with her blood pressure control since the last visit  Symptoms:      Home monitoring: The patient is not checking blood pressure at home  Medications: the patient is adherent with her medication regimen  -- She denies medication side effects  Medication(s): a beta blocking agent,-- a calcium channel blocker-- and-- an angiotensin receptor blocker  Review of Systems   Complete-Female:      Constitutional: No fever, no chills, feels well, no tiredness, no recent weight gain or weight loss  Eyes: eyesight problems  Cardiovascular: No complaints of slow heart rate, no fast heart rate, no chest pain, no palpitations, no leg claudication, no lower extremity edema  Respiratory: shortness of breath during exertion, but-- no cough-- and-- no wheezing  Gastrointestinal: as noted in HPI  Neurological: numbness-- and-- tingling, but-- no headache-- and-- no dizziness  Psychiatric: anxiety, but-- no depression  Active Problems   1  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)   2  Arteriosclerotic heart disease (ASHD) (414 00) (I25 10)   3  Chronic hypoxemic respiratory failure (518 83,799 02) (J96 11)   4  Chronic rhinitis (472 0) (J31 0)   5  Dyspnea (786 09) (R06 00)   6   Hyperlipidemia (272 4) (E78 5)   7  Hypertension (401 9) (I10)   8  Impaired fasting glucose (790 21) (R73 01)   9  Ischemic colitis (557 9) (K55 9)   10  Peripheral neuropathy (356 9) (G62 9)   11  Pulmonary fibrosis (515) (J84 10)   12  Stroke Syndrome (436)    Past Medical History   1  History of Cardiac Catheterization  (Diagnostic)   2  History of urinary tract infection (V13 02) (Z87 440)   3  History of Ischemic colitis (557 9) (K55 9)   4  Old myocardial infarction (12) (I25 2)  Active Problems And Past Medical History Reviewed: The active problems and past medical history were reviewed and updated today  Surgical History   1  History of Appendectomy   2  History of Cataract Surgery   3  History of Cholecystotomy  Surgical History Reviewed: The surgical history was reviewed and updated today  Family History   Mother    1  Family history of cardiac disorder (V17 49) (Z82 49)   2  Family history of stroke (V17 1) (Z82 3)  Father    3  Family history of cardiac disorder (V17 49) (Z82 49)  Daughter    4  Family history of Brain Surgery  Son    5  Family history of Family Health Status 1  Children    6  Family history of Son 1-1   Family History Reviewed: The family history was reviewed and updated today  Social History    · Denied: History of Alcohol Use (History)   · Being A Social Drinker   ·    · Former smoker (J61 22) (Y30 163)   · [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  Social History Reviewed: The social history was reviewed and is unchanged  Current Meds    1  ALPRAZolam 0 25 MG Oral Tablet; Take 1 tablet 3 times daily as needed for anxiety; Therapy: 89TSR1741 to (Evaluate:2018); Last Rx:77Uoa2353 Ordered   2  AmLODIPine Besylate 5 MG Oral Tablet; TAKE 1 TABLET TWICE DAILY; Therapy: 33LPV3330 to (Dawna Sequeira)  Requested for: 83MFP0461; Last Rx:2018     Ordered   3   Atorvastatin Calcium 40 MG Oral Tablet; 3 TIMES WEEKLY  Requested for: 2017; Last Rx:19Apr2017 Ordered   4  Azithromycin 250 MG Oral Tablet; Take as directed; Therapy: 59Ppo2420 to (Evaluate:34Cen6456)  Requested for: 52Sry1692; Last Rx:59Unj7886     Ordered   5  Candesartan Cilexetil 16 MG Oral Tablet; TAKE 1 TABLET DAILY  Requested for: 19Apr2017; Last     Rx:19Apr2017 Ordered   6  Centrum Silver TABS; Take 1 tablet daily Recorded   7  Clopidogrel Bisulfate 75 MG Oral Tablet; TAKE 1 TABLET DAILY  Requested for: 19Apr2017; Last     Rx:19Apr2017 Ordered   8  Colace 100 MG Oral Capsule; TAKE 1 CAPSULE Daily PRN constipation; Therapy: 05EIU2941 to (Evaluate:68Kio5072); Last Rx:04Pgo0461 Ordered   9  CoQ-10 100 MG Oral Capsule; take 1 capsule daily; Therapy: (Recorded:27Tzl4816) to Recorded   10  Fluticasone Propionate 50 MCG/ACT Nasal Suspension; use 2 sprays in each nostril once daily; Therapy: 46DEQ2176 to (Evaluate:42Yad5790)  Requested for: 84Jnw3400; Last Rx:39Tqr6347      Ordered   11  Nadolol 20 MG Oral Tablet; Take 1 tablet twice daily; Therapy: 07JKL4383 to (Evaluate:79Yil7435)  Requested for: 80FSY9049; Last Rx:56Kij7113      Ordered   12  Nephrocaps CAPS; TAKE 1 CAPSULE DAILY; Therapy: (Recorded:00Ayn7881) to Recorded   13  Nitrostat 0 4 MG Sublingual Tablet Sublingual; PLACE 1 TABLET UNDER THE TONGUE EVERY 5      MINUTES FOR UP TO 3 DOSES AS NEEDED FOR CHEST PAIN  CALL 911 IF PAIN PERSISTS; Therapy: 81GRD2658 to (Evaluate:00Drk5429)  Requested for: 94Qci7360; Last Rx:80Fvy1856      Ordered   14  Vitamin D 1000 UNIT CAPS; take 1 capsule daily; Therapy: (Recorded:46Sct7843) to Recorded  Medication List Reviewed: The medication list was reviewed and updated today  Allergies   1  Losartan Potassium TABS   2  Aspirin TABS   3  Carvedilol TABS   4  Diovan TABS   5  HydroCHLOROthiazide CAPS   6  Codeine Derivatives   7  Corticosteroids   8  Valium TABS   9  Vitamin E CAPS  10  IVP Dye   11   Shellfish    Vitals   Vital Signs    Recorded: 22EBJ1050 09: 11AM Recorded: 56TTK4106 08:45AM   Temperature  97 6 F   Heart Rate  64   Respiration  16   Systolic  761   Diastolic  70   Height  5 ft 7 in   Weight  190 lb    BMI Calculated  29 76   BSA Calculated  1 98   O2 Saturation 88, RA 81     Physical Exam        Constitutional      General appearance: No acute distress, well appearing and well nourished  Eyes wears glasses  Ears, Nose, Mouth, and Throat wears nasal cannula  Oropharynx: Normal with no erythema, edema, exudate or lesions  Pulmonary      Respiratory effort: No increased work of breathing or signs of respiratory distress  Auscultation of lungs: Clear to auscultation  Auscultation of the lungs revealed decreased breath sounds diffusely  fine crackles  Cardiovascular      Auscultation of heart: Normal rate and rhythm, normal S1 and S2, without murmurs         Examination of extremities for edema and/or varicosities: Normal        Psychiatric      Orientation to person, place, and time: Normal        Mood and affect: Normal           Signatures    Electronically signed by : Mann Lee DO; Carlos 10 2018 11:04AM EST                       (Author)

## 2018-01-15 NOTE — MISCELLANEOUS
Provider Comments  Provider Comments:   pt  did not show for appt today   left message to call      Signatures   Electronically signed by : ZAINAB Marie ; Jul 13 2016  4:37PM EST                       (Author)

## 2018-01-17 PROBLEM — J96.21 ACUTE ON CHRONIC RESPIRATORY FAILURE WITH HYPOXIA (HCC): Status: ACTIVE | Noted: 2018-01-01

## 2018-01-17 PROBLEM — J18.9 PNEUMONIA: Status: ACTIVE | Noted: 2018-01-01

## 2018-01-17 PROBLEM — J84.10 PULMONARY FIBROSIS (HCC): Status: ACTIVE | Noted: 2018-01-01

## 2018-01-17 PROBLEM — I10 HYPERTENSION: Status: ACTIVE | Noted: 2018-01-01
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
## 2018-01-17 PROBLEM — N17.9 ACUTE KIDNEY INJURY (HCC): Status: ACTIVE | Noted: 2018-01-01

## 2018-01-17 NOTE — ASSESSMENT & PLAN NOTE
She uses 6 L O2 and oxymizer at home    O2 sat 97 percent currently on high-flow 60% FiO2  Likely due to pneumonia in the setting pulmonary fibrosis/end-stage lung disease  Patient will be closely monitored level 2 step-down bed  Pt is DNR/DNI

## 2018-01-17 NOTE — ASSESSMENT & PLAN NOTE
MICHAEL POA  Likely pre renal due to poor oral intake/Use of ARB with questionable ATN secondary to hypoxia    Will gently hydrate and monitor Cr  Hold ARB

## 2018-01-17 NOTE — ED ATTENDING ATTESTATION
Laure Perez MD, saw and evaluated the patient  I have discussed the patient with the resident/non-physician practitioner and agree with the resident's/non-physician practitioner's findings, Plan of Care, and MDM as documented in the resident's/non-physician practitioner's note, except where noted  All available labs and Radiology studies were reviewed  At this point I agree with the current assessment done in the Emergency Department  I have conducted an independent evaluation of this patient a history and physical is as follows:      Critical Care Time  CritCare Time    Procedures     79 yo female with hx of interstitial lung disease, home oxygen 6 liters c/o sob, uri symptoms, wheezing, given zpak by pcp  Pt today very fatigued and fell on face  No loc  Pt on plavix  No numbness, tingling, no weakness  No neck pain  Pt noted to by hypoxic on 6 liters to 70's  Pt on nrb increased sats into 90's  Vss, afebrile, lungs with diminished bs, wheezes, crackles, rrr, abdomen soft nontender, no neuro deficits, nasal abrasion, no spinal tenderness  Cardiac workup, cxr, ct head, cspine, udn, steroids given prehospital   Hfnc

## 2018-01-17 NOTE — ED PROVIDER NOTES
History  Chief Complaint   Patient presents with    Weakness - Generalized     patient has been treated for URI since friday, taking a zpack  Today, fell down this morning due to weakness  Patient with severe SOB upon arrival to ED  This is an 77-year-old female presenting to the emergency department for generalized weakness, fall, and worsening shortness of breath  She has a history of interstitial lung disease and is on 6 L nasal cannula at baseline at home  The patient states that she has had URI like symptoms including cough fevers and congestion over the past week  This is associated with worsening shortness of breath as well  Today she was feeling markedly short of breath as well as fatigue  Because the fatigue she had a fall  She fell forward and struck the bridge of her nose on the ground  She did not lose consciousness  She has no blurry vision, visual changes or any other neurological deficits  She denies any headache or neck pain  She is on Plavix for MI   EMS was called because of the fall and she was found to be hypoxic at 75 percent on 6 liters nasal cannula  She was placed on face mask and her O2 sats improved to the mid 80s  When she presented to the emergency department she was back on is okay elbow had O2 sats at 75%  She received pre-hospital Solu-Medrol and a breathing treatment            Prior to Admission Medications   Prescriptions Last Dose Informant Patient Reported? Taking?    Candesartan Cilexetil (ATACAND PO)   Yes Yes   Sig: Take by mouth daily   Multiple Vitamins-Minerals (CENTRUM ADULTS PO) 1/17/2018 at Unknown time  Yes Yes   Sig: Take by mouth   Nutritional Supplements (VITAMIN D BOOSTER PO)   Yes Yes   Sig: Take by mouth   amLODIPine (NORVASC) 5 mg tablet 1/17/2018 at 0600  Yes Yes   Sig: Take 5 mg by mouth 2 (two) times a day   atorvastatin (LIPITOR) 40 mg tablet   Yes Yes   Sig: Take 40 mg by mouth daily   b complex-vitamin C-folic acid (NEPHROCAPS) 1 mg capsule 1/17/2018 at Unknown time  Yes Yes   Sig: Take 1 capsule by mouth daily   clopidogrel (PLAVIX) 75 mg tablet 1/17/2018 at 0600  Yes Yes   Sig: Take 75 mg by mouth daily   co-enzyme Q-10 30 MG capsule 1/17/2018 at Unknown time  Yes Yes   Sig: Take 100 mg by mouth daily   nadolol (CORGARD) 20 mg tablet 1/17/2018 at 0600  Yes Yes   Sig: Take 20 mg by mouth 2 (two) times a day   nitroglycerin (NITROSTAT) 0 4 mg SL tablet Unknown at Unknown time  Yes No   Sig: Place 0 4 mg under the tongue every 5 (five) minutes as needed for chest pain      Facility-Administered Medications: None       Past Medical History:   Diagnosis Date    [facility]cere[facility] [address] [facility]     Hyperlipidemia     Hypertension     [facility]     Oxygen dependent     wears 6 5 liter oxygen at home    Stroke [facility])        No past surgical history on file  No family history on file  I have reviewed and agree with the history as documented  Social History   Substance Use Topics    Smoking status: Former Smoker    Smokeless tobacco: Not on file    Alcohol use No        Review of Systems   Constitutional: Positive for fatigue and fever  Negative for appetite change and chills  HENT: Negative for sneezing and sore throat  Eyes: Negative for visual disturbance  Respiratory: Positive for cough, chest tightness, shortness of breath and wheezing  Negative for choking  Cardiovascular: Negative for chest pain and palpitations  Gastrointestinal: Negative for abdominal pain, constipation, diarrhea, nausea and vomiting  Genitourinary: Negative for difficulty urinating and dysuria  Neurological: Negative for dizziness, weakness, light-headedness, numbness and headaches  All other systems reviewed and are negative        Physical Exam  ED Triage Vitals   Temperature Pulse Respirations Blood Pressure SpO2   01/17/18 1355 01/17/18 1355 01/17/18 1355 01/17/18 1355 01/17/18 1355   97 6 °F (36 4 °C) 66 (!) 30 143/78 (!) 80 %      Temp Source Heart Rate Source Patient Position - Orthostatic VS BP Location FiO2 (%)   01/17/18 1355 01/17/18 1400 01/17/18 1355 01/17/18 1355 --   Oral Monitor Lying Right arm       Pain Score       01/17/18 1355       No Pain           Orthostatic Vital Signs  Vitals:    01/17/18 1814 01/17/18 1830 01/17/18 1900 01/17/18 2130   BP: 120/57 110/53 100/53 101/51   Pulse: 55 (!) 50 (!) 51 (!) 48   Patient Position - Orthostatic VS: Lying Lying         Physical Exam   Constitutional: She is oriented to person, place, and time  She appears well-developed and well-nourished  She appears distressed  HENT:   Head: Normocephalic and atraumatic  Mouth/Throat: Oropharynx is clear and moist    Eyes: EOM are normal  Pupils are equal, round, and reactive to light  Neck: No JVD present  No tracheal deviation present  Cardiovascular: Normal rate, regular rhythm, normal heart sounds and intact distal pulses  Exam reveals no gallop and no friction rub  No murmur heard  Pulmonary/Chest: Tachypnea noted  She is in respiratory distress  She has decreased breath sounds  She has wheezes  She has rhonchi  She has no rales  Abdominal: Soft  Bowel sounds are normal  She exhibits no distension  There is no tenderness  There is no rebound and no guarding  Neurological: She is alert and oriented to person, place, and time  No cranial nerve deficit  She exhibits normal muscle tone  Skin: Skin is warm and dry  She is not diaphoretic  No pallor  Psychiatric: She has a normal mood and affect   Her behavior is normal        ED Medications  Medications    EMS REPLENISHMENT MED (not administered)   amLODIPine (NORVASC) tablet 5 mg (0 mg Oral Hold 1/17/18 2127)   atorvastatin (LIPITOR) tablet 40 mg (not administered)   b complex-vitamin C-folic acid (NEPHROCAPS) capsule 1 capsule (not administered)   clopidogrel (PLAVIX) tablet 75 mg (not administered)   co-enzyme Q-10 capsule 100 mg (not administered)   nadolol (CORGARD) tablet 20 mg (20 mg Oral Not Given 1/17/18 2031)   nitroglycerin (NITROSTAT) SL tablet 0 4 mg (not administered)   sodium chloride 0 9 % infusion (50 mL/hr Intravenous New Bag 1/17/18 1909)   heparin (porcine) subcutaneous injection 5,000 Units (5,000 Units Subcutaneous Given 1/17/18 2133)   ceftriaxone (ROCEPHIN) 1 g/50 mL in dextrose IVPB (not administered)   acetaminophen (TYLENOL) tablet 650 mg (not administered)   guaiFENesin (MUCINEX) 12 hr tablet 600 mg (0 mg Oral Hold 1/17/18 2127)   levalbuterol (XOPENEX) inhalation solution 1 25 mg (1 25 mg Nebulization Given 1/17/18 2049)   sodium chloride 0 9 % inhalation solution 3 mL (3 mL Nebulization Given 1/17/18 2049)   levalbuterol (XOPENEX) inhalation solution 1 25 mg (not administered)   sodium chloride 0 9 % inhalation solution 3 mL (not administered)   azithromycin (ZITHROMAX) 500 mg in sodium chloride 0 9% 250mL IVPB 500 mg (500 mg Intravenous New Bag 1/17/18 2133)   sodium chloride 0 9 % inhalation solution **AcuDose Override Pull** (not administered)   ipratropium (ATROVENT) 0 02 % inhalation solution 0 5 mg (0 5 mg Nebulization Given 1/17/18 1407)   albuterol inhalation solution 5 mg (5 mg Nebulization Given 1/17/18 1407)   aspirin chewable tablet 324 mg (324 mg Oral Given 1/17/18 1445)   vancomycin (VANCOCIN) 1,250 mg in sodium chloride 0 9 % 250 mL IVPB (0 mg/kg × 83 kg Intravenous Stopped 1/17/18 1758)   cefepime (MAXIPIME) 2 g/50 mL dextrose IVPB (0 mg Intravenous Stopped 1/17/18 1600)       Diagnostic Studies  Results Reviewed     Procedure Component Value Units Date/Time    Blood gas, arterial [[id number]]  (Abnormal) Collected:  01/17/18 2058    Lab Status:  Final result Specimen:  Blood, Arterial from Radial, Right Updated:  01/17/18 2126     pH, Arterial 7 188 (LL)     pCO2, Arterial 84 6 (HH) mm Hg      pO2, Arterial 70 4 (L) mm Hg      HCO3, Arterial 31 4 (H) mmol/L      Base Excess, Arterial 1 3 mmol/L      O2 Content, Arterial 14 8 (L) mL/dL      O2 HGB,Arterial  90 2 (L) %      SOURCE Radial, Right     CHILO TEST Yes     Non Vent Type- HFNC HFNC Flow     NV HFNC FIO2 65     HFNC FLOW LPM 60    Strep Pneumoniae, Urine [[id number]]     Lab Status:  No result Specimen:  Urine     Legionella antigen, urine [[id number]]     Lab Status:  No result Specimen:  Urine     Sputum culture and Gram stain [[id number]]     Lab Status:  No result Specimen:  Sputum     Platelet count [[id number]]     Lab Status:  No result Specimen:  Blood     Influenza A/B and RSV by PCR (indicated for patients >2 mo of age) [[id number]] Collected:  01/17/18 1758    Lab Status: In process Specimen:  Nasopharyngeal from Nasopharyngeal Swab Updated:  01/17/18 1804    Blood culture #1 [[id number]] Collected:  01/17/18 1508    Lab Status: In process Specimen:  Blood from Hand, Right Updated:  01/17/18 1512    Blood culture #2 [[id number]] Collected:  01/17/18 1500    Lab Status: In process Specimen:  Blood from Arm, Right Updated:  01/17/18 1505    Troponin I [[id number]]  (Abnormal) Collected:  01/17/18 1409    Lab Status:  Final result Specimen:  Blood from Arm, Right Updated:  01/17/18 1437     Troponin I 0 20 (H) ng/mL     Narrative:         [employer] Chemistry analyzer 99% cutoff is > 0 04 ng/mL in network labs    o cTnI 99% cutoff is useful only when applied to patients in the clinical setting of myocardial ischemia  o cTnI 99% cutoff should be interpreted in the context of clinical history, ECG findings and possibly cardiac imaging to establish correct diagnosis  o cTnI 99% cutoff may be suggestive but clearly not indicative of a coronary event without the clinical setting of myocardial ischemia      Basic metabolic panel [[id number]]  (Abnormal) Collected:  01/17/18 1409    Lab Status:  Final result Specimen:  Blood from Arm, Right Updated:  01/17/18 1430     Sodium 131 (L) mmol/L      Potassium 3 8 mmol/L      Chloride 92 (L) mmol/L      CO2 32 mmol/L      Anion Gap 7 mmol/L      BUN 54 (H) mg/dL      Creatinine 1 35 (H) mg/dL      Glucose 109 mg/dL      Calcium 8 9 mg/dL      eGFR 36 ml/min/1 73sq m     Narrative:         National Kidney Disease Education Program recommendations are as follows:  GFR calculation is accurate only with a steady state creatinine  Chronic Kidney disease less than 60 ml/min/1 73 sq  meters  Kidney failure less than 15 ml/min/1 73 sq  meters  CBC and differential [[id number]]  (Abnormal) Collected:  01/17/18 1409    Lab Status:  Final result Specimen:  Blood from Arm, Right Updated:  01/17/18 1417     WBC 5 95 Thousand/uL      RBC 3 95 Million/uL      Hemoglobin 11 4 (L) g/dL      Hematocrit 35 1 %      MCV 89 fL      MCH 28 9 pg      MCHC 32 5 g/dL      RDW 13 5 %      MPV 10 1 fL      Platelets 554 Thousands/uL      nRBC 0 /100 WBCs      Neutrophils Relative 64 %      Lymphocytes Relative 26 %      Monocytes Relative 10 %      Eosinophils Relative 0 %      Basophils Relative 0 %      Neutrophils Absolute 3 78 Thousands/µL      Lymphocytes Absolute 1 56 Thousands/µL      Monocytes Absolute 0 57 Thousand/µL      Eosinophils Absolute 0 00 Thousand/µL      Basophils Absolute 0 02 Thousands/µL                  CT spine cervical without contrast   Final Result by Coleen Orta MD (01/17 1612)      No cervical spine fracture or traumatic malalignment  Workstation performed: [id number]         CT head without contrast   Final Result by Coleen Orta MD (01/17 1600)      No acute intracranial abnormality  Workstation performed: [id number]         XR chest 1 view   Final Result by Allen Sanon MD (01/17 1447)      Stable COPD and chronic interstitial changes  More focal consolidation in the right lung base could represent developing pneumonia or atelectasis           Workstation performed: [id number]               Procedures  Procedures      Phone Consults  ED Phone Contact    ED Course  ED Course as of Jan 17 2157 Wed Jan 17, 2018   [id number] Patient started on antibiotic therapy and light of chest x-ray reads  Patient resting comfortably on high flow nasal cannula  Pending results of head CT for final disposition  Will admit for pneumonia to either Medicine versus trauma if she has a traumatic brain bleed                                [facility]  Number of Diagnoses or Management Options  Respiratory distress:   Diagnosis management comments: 49-year-old female in a moderate respiratory distress with worsening interstitial lung disease  Suspect viral URI versus possible pneumonia versus potential worsening chronic condition  Regardless the patient does have increased oxygen requirements at this time  She was transitioned temporary early to non-rebreather and achieved and O2 saturation 100%  She was then transferred to high-flow nasal cannula with respiratory therapy at bedside  She is comfortable on 60% FiO2 at 60 L a minute satting in the mid 90s  Her work of breathing has improved as well  Will check laboratory work, cardiac workup and chest x-ray  Will admit for increased oxygen requirements  Of note patient is DNR/DNI          CritCare Time    Disposition  Final diagnoses:   Respiratory distress     Time reflects when diagnosis was documented in both MDM as applicable and the Disposition within this note     Time User Action Codes Description Comment    1/17/2018  4:24 PM Marc Kingsley Add [R06 00] Respiratory distress     1/17/2018  5:51 PM Alfreida Albino Add [J84 10] Pulmonary fibrosis ([address] )     1/17/2018  5:51 PM Alfreida Albino Modify [J84 10] Pulmonary fibrosis ([address] )     1/17/2018  5:51 PM Alfreida Albino Add [J96 21] Acute on chronic respiratory failure with hypoxia ([address] )     1/17/2018  5:51 PM Alfreida Albino Modify [J96 21] Acute on chronic respiratory failure with hypoxia ([address] )     1/17/2018  5:51 PM Alfreida Albino Add [J18 9] Pneumonia     1/17/2018  5:51 PM Alfreida Albino Modify [J18 9] Pneumonia       ED Disposition     ED Disposition Condition Comment    Admit  Case was discussed with PAYAM and the patient's admission status was agreed to be Admission Status: inpatient status to the service of Dr Bruna Lauren   Follow-up Information    None       Patient's Medications   Discharge Prescriptions    No medications on file     No discharge procedures on file  ED Provider  Attending physically available and evaluated Celia Alonzo I managed the patient along with the ED Attending      Electronically Signed by         Junior Velasquez MD  01/17/18 4322

## 2018-01-17 NOTE — ED NOTES
Luis Alberto at the bedside  Glasses placed in purse  Cell phone in luis alberto Downey RN  01/17/18 1400

## 2018-01-17 NOTE — ASSESSMENT & PLAN NOTE
(+) Recent sick contact  Otherwise afebrile here/normal white blood cell count with no left shift  Incentive spirometry  Will appreciate Pulmonary input

## 2018-01-17 NOTE — ASSESSMENT & PLAN NOTE
Patient has been followed by palliative care outpt  It appears that she did not tolerate liquid morphine

## 2018-01-17 NOTE — H&P
H&P- Tristin Rose 11/17/1931, 80 y o  female MRN: [id number]    Unit/Bed#: ED 05 Encounter: [id number]    Primary Care Provider: Trenton Wadsworth MD (Inactive)   Date and time admitted to hospital: 1/17/2018  1:48 PM        * Acute on chronic respiratory failure with hypoxia ([facility] [address] )   Assessment & Plan    She uses 6 L O2 and oxymizer at home  O2 sat 97 percent currently on high-flow 60% FiO2  Likely due to pneumonia in the setting pulmonary fibrosis/end-stage lung disease  Patient will be closely monitored level 2 step-down bed  Pt is DNR/DNI        Pneumonia   Assessment & Plan    (+) Recent sick contact  Otherwise afebrile here/normal white blood cell count with no left shift  Incentive spirometry  Will appreciate Pulmonary input        Pulmonary fibrosis (HCC)   Assessment & Plan    Patient has been followed by palliative care outpt  It appears that she did not tolerate liquid morphine           Acute kidney injury ([facility] [address] )   Assessment & Plan    MICHAEL POA  Likely pre renal due to poor oral intake/Use of ARB with questionable ATN secondary to hypoxia  Will gently hydrate and monitor Cr  Hold ARB        Fall   Assessment & Plan    PT eval        Hypertension   Assessment & Plan    Continue home meds            VTE Prophylaxis: Heparin  / sequential compression device   Code Status: DNR/DNI  POLST: POLST form is not discussed and not completed at this time  Discussion with family: Daughter     Anticipated Length of Stay:  Patient will be admitted on an Inpatient basis with an anticipated length of stay of  >2 midnights  Justification for Hospital Stay: Above    Total Time for Visit, including Counseling / Coordination of Care: 30 minutes  Greater than 50% of this total time spent on direct patient counseling and coordination of care      Chief Complaint:   Dyspnea/cough    History of Present Illness:    Tristin Rose is a 80 y o  female with pulmonary fibrosis and chronic hypoxic respiratory failure on 6 L oxygen an Oxymizer at home who presents with increasing dyspnea, cough and fatigue  She started having cold-like symptoms 3 days ago with dry cough  She reports sick contact from her daughter  She has been increasingly dyspneic for the last several days with increasing fatigue  Today she fell at home facing down due to generalized weakness  She denies loss of consciousness or chest pain  She denies focal weakness or numbness  She denies fever or chills  Her O2 sat on arrival in the ED was and 80 percent improved to 97 percent with 60 percent FiO2 high-flow oxygen  Review of Systems:    Review of Systems   Constitutional: Positive for appetite change and fatigue  Negative for chills  Respiratory: Positive for cough and shortness of breath  Negative for choking and chest tightness  Cardiovascular: Negative for palpitations and leg swelling  All other systems reviewed and are negative  Past Medical and Surgical History:     Past Medical History:   Diagnosis Date    [facility]A [facility]cerebral vascular accident) [facility] [address] [facility]     Hyperlipidemia     Hypertension     [facility]     Oxygen dependent     wears 6 5 liter oxygen at home    Stroke [facility])        No past surgical history on file  Meds/Allergies:    Prior to Admission medications    Medication Sig Start Date End Date Taking?  Authorizing Provider   amLODIPine (NORVASC) 5 mg tablet Take 5 mg by mouth 2 (two) times a day   Yes Historical Provider, MD   atorvastatin (LIPITOR) 40 mg tablet Take 40 mg by mouth daily   Yes Historical Provider, MD   b complex-vitamin C-folic acid (NEPHROCAPS) 1 mg capsule Take 1 capsule by mouth daily   Yes Historical Provider, MD   Candesartan Cilexetil (ATACAND PO) Take by mouth daily   Yes Historical Provider, MD   clopidogrel (PLAVIX) 75 mg tablet Take 75 mg by mouth daily   Yes Historical Provider, MD   co-enzyme Q-10 30 MG capsule Take 100 mg by mouth daily   Yes Historical Provider, MD   Multiple Vitamins-Minerals (CENTRUM ADULTS PO) Take by mouth   Yes Historical Provider, MD   nadolol (CORGARD) 20 mg tablet Take 20 mg by mouth 2 (two) times a day   Yes Historical Provider, MD   nitroglycerin (NITROSTAT) 0 4 mg SL tablet Place 0 4 mg under the tongue every 5 (five) minutes as needed for chest pain   Yes Historical Provider, MD   Nutritional Supplements (VITAMIN D BOOSTER PO) Take by mouth   Yes Historical Provider, MD         Allergies: Allergies   Allergen Reactions    Codeine        Social History:     Marital Status:    Patient Pre-hospital Living Situation: Independent living  Patient Pre-hospital Level of Mobility:  Ambulatory with no assistive device    History   Alcohol Use No     History   Smoking Status    Former Smoker   Smokeless Tobacco    Not on file     History   Drug Use No       Family History:    No family history on file  Physical Exam:     Vitals:   Blood Pressure: 103/51 (01/17/18 1730)  Pulse: (!) 50 (01/17/18 1730)  Temperature: 97 6 °F (36 4 °C) (01/17/18 1355)  Temp Source: Oral (01/17/18 1355)  Respirations: (!) 28 (01/17/18 1730)  Weight - Scale: 83 kg (183 lb) (01/17/18 1405)  SpO2: 95 % (01/17/18 1730)    Physical Exam   Constitutional: She is oriented to person, place, and time  Mild respiratory distress   Eyes: Conjunctivae and EOM are normal  Pupils are equal, round, and reactive to light  Neck: Normal range of motion  Neck supple  No JVD present  Cardiovascular: Normal rate and regular rhythm  Pulmonary/Chest: She is in respiratory distress  She has wheezes  She has rales  Abdominal: Soft  Bowel sounds are normal  She exhibits no distension  Musculoskeletal: She exhibits no edema or tenderness  Neurological: She is alert and oriented to person, place, and time  Skin: Skin is warm and dry  She is not diaphoretic  No erythema  Psychiatric: She has a normal mood and affect           Additional Data:     Lab Results:       Results from last 7 days  Lab Units 01/17/18  1409   WBC Thousand/uL 5 95   HEMOGLOBIN g/dL 11 4*   HEMATOCRIT % 35 1   PLATELETS Thousands/uL 167   NEUTROS PCT % 64   LYMPHS PCT % 26   MONOS PCT % 10   EOS PCT % 0       Results from last 7 days  Lab Units 01/17/18  1409   SODIUM mmol/L 131*   POTASSIUM mmol/L 3 8   CHLORIDE mmol/L 92*   CO2 mmol/L 32   BUN mg/dL 54*   CREATININE mg/dL 1 35*   CALCIUM mg/dL 8 9   GLUCOSE RANDOM mg/dL 109           Imaging:    CT spine cervical without contrast   Final Result by Deanna Barron MD (01/17 1612)      No cervical spine fracture or traumatic malalignment  Workstation performed: [id number]         CT head without contrast   Final Result by Deanna Barron MD (01/17 1600)      No acute intracranial abnormality  Workstation performed: [id number]         XR chest 1 view   Final Result by Edna Silva MD (01/17 1447)      Stable COPD and chronic interstitial changes  More focal consolidation in the right lung base could represent developing pneumonia or atelectasis  Workstation performed: [id number]             EKG, Pathology, and Other Studies Reviewed on Admission:   · EKG: NSR 60/min      ** Please Note: This note has been constructed using a voice recognition system   **

## 2018-01-18 PROBLEM — E87.1 HYPONATREMIA: Status: ACTIVE | Noted: 2018-01-01

## 2018-01-18 PROBLEM — I21.A1 NON-ST ELEVATION MYOCARDIAL INFARCTION (NSTEMI), TYPE 2: Status: ACTIVE | Noted: 2018-01-01

## 2018-01-18 NOTE — ED NOTES
This RN spoke to Dr Jacky Salinas of [place]  Informed her that troponin has not been repeated since patient arrived in ED  Per Dr Jacky Salinas  RN is to repeat with morning labs        Zaynab Humphries RN  01/18/18 1430

## 2018-01-18 NOTE — SPEECH THERAPY NOTE
Consult received  RN Dysphagia Screen/Assessment completed & passed  Spoke c EULALIO Kiser & confirmed consult d/c at this time

## 2018-01-18 NOTE — CASE MANAGEMENT
Initial Clinical Review    Admission: Date/Time/Statement: 1/17/18 @ 1625     Orders Placed This Encounter   Procedures    Inpatient Admission (expected length of stay for this patient is greater than two midnights)     Standing Status:   Standing     Number of Occurrences:   1     Order Specific Question:   Admitting Physician     Answer:   Enio Holguin [[id number]]     Order Specific Question:   Level of Care     Answer:   Level 2 Stepdown / HOT [14]     Order Specific Question:   Estimated length of stay     Answer:   More than 2 Midnights     Order Specific Question:   Certification     Answer:   I certify that inpatient services are medically necessary for this patient for a duration of greater than two midnights  See H&P and MD Progress Notes for additional information about the patient's course of treatment  ED: Date/Time/Mode of Arrival:   ED Arrival Information     Expected Arrival Acuity Means of Arrival Escorted By Service Admission Type    - 1/17/2018 13:48 Emergent Ambulance [facility] EMS General Medicine Emergency    Arrival Complaint    weakness          Chief Complaint:   Chief Complaint   Patient presents with    Weakness - Generalized     patient has been treated for URI since friday, taking a zpack  Today, fell down this morning due to weakness  Patient with severe SOB upon arrival to ED  History of Illness:       Carlos Enrique Ireland is a 80 y o  female with pulmonary fibrosis and chronic hypoxic respiratory failure on 6 L oxygen an Oxymizer at home who presents with increasing dyspnea, cough and fatigue  She started having cold-like symptoms 3 days ago with dry cough  She reports sick contact from her daughter  She has been increasingly dyspneic for the last several days with increasing fatigue  Today she fell at home facing down due to generalized weakness  She denies loss of consciousness or chest pain  She denies focal weakness or numbness    She denies fever or chills  Her O2 sat on arrival in the ED was and 80 percent improved to 97 percent with 60 percent FiO2 high-flow oxygen          ED Vital Signs:   ED Triage Vitals   Temperature Pulse Respirations Blood Pressure SpO2   01/17/18 1355 01/17/18 1355 01/17/18 1355 01/17/18 1355 01/17/18 1355   97 6 °F (36 4 °C) 66 (!) 30 143/78 (!) 80 %      Temp Source Heart Rate Source Patient Position - Orthostatic VS BP Location FiO2 (%)   01/17/18 1355 01/17/18 1400 01/17/18 1355 01/17/18 1355 --   Oral Monitor Lying Right arm       Pain Score       01/17/18 1355       No Pain        Wt Readings from Last 1 Encounters:   01/17/18 83 kg (183 lb)       Vital Signs (abnormal):  01/17/18 2212   97 3 °F (36 3 °C)  --  --  --  --  --  --   01/17/18 2203  --  --  --  --  96 %  --  --   01/17/18 2130  --   48   24  101/51  97 %  Other (comment)  --   O2 Device: hi miguel nc at 01/17/18 2130   01/17/18 2049  --  --  --  --  97 %  --  --   01/17/18 1900  --   51   24  100/53  96 %  Other (comment)  --   O2 Device: hi miguel NC at 01/17/18 1900   01/17/18 1830  --   50   28  110/53  95 %  Other (comment)  Lying   O2 Device: hi flow at 01/17/18 1830   01/17/18 1814  --  55   28  120/57  97 %  Other (comment)  Lying   O2 Device: hi flow at 01/17/18 1814   01/17/18 1730  --   50   28  103/51  95 %  Other (comment)  Lying   O2 Device: hi flow at 01/17/18 1730   01/17/18 1700  --  60   28  119/58  97 %  Other (comment)  Lying   O2 Device: hi flow at 01/17/18 1700   01/17/18 1630  --  60   28  104/52  96 %  Other (comment)  Lying   O2 Device: hi flow at 01/17/18 1630   01/17/18 1600  --   54   28  121/58  93 %  Other (comment)  --   O2 Device: hi miguel NC at 01/17/18 1600   01/17/18 1551  --  --  --  --  91 %  --  --   01/17/18 1530  --  60   28  125/61  94 %  Other (comment)  Lying   O2 Device: hi flow at 01/17/18 1530   01/17/18 1500  --  56   28  115/56  94 %  Other (comment)  --   O2 Device: hi miguel NC at 01/17/18 1500   01/17/18 1430  --  60   36 114/57  96 %  --  Sitting   O2 Device: high flow at 01/17/18 1430   01/17/18 1428  --  --  --  --  96 %  --  --   01/17/18 1416  --  --  --  --  99 %  --  --   01/17/18 1411  --  --  --  --  --  Nasal cannula  --     01/17/18 1400  --  66   26  143/78  91 %  --  Lying         Abnormal Labs/Diagnostic Test Results:      CHEST X RAY     Stable COPD and chronic interstitial changes   More focal consolidation in the right lung base could represent developing pneumonia or atelectasis              INFLU A PCR None Detected  Detected       Component Value Flag Ref Range Units Status   pH, Arterial 7 188   LL  7 350 - 7 450  Final   pCO2, Arterial 84 6   HH  36 0 - 44 0 mm Hg Final   pO2, Arterial 70 4   L  75 0 - 129 0 mm Hg Final   HCO3, Arterial 31 4   H  22 0 - 28 0 mmol/L Final   Base Excess, Arterial 1 3    mmol/L Final   O2 Content, Arterial 14 8   L  16 0 - 23 0 mL/dL Final   O2 HGB,Arterial  90 2   L  94 0 - 97 0 % Final     Sodium 131 (L) 136 - 145 mmol/L   Potassium 3 8 3 5 - 5 3 mmol/L   Chloride 92 (L) 100 - 108 mmol/L   CO2 32 21 - 32 mmol/L   Anion Gap 7 4 - 13 mmol/L   BUN 54 (H) 5 - 25 mg/dL   Creatinine 1 35 (H) 0 60 - 1 30 mg/dL       Troponin I 0 06 (H) <=0 04 ng/mL     Troponin I 0 20 (H) <=0 04 ng/mL     EKG  RATE  60    NORMAL           ED Treatment:   Medication Administration from 01/17/2018 1348 to 01/18/2018 1419       Date/Time Order Dose Route     01/17/2018 1407 ipratropium (ATROVENT) 0 02 % inhalation solution 0 5 mg 0 5 mg Nebulization     01/17/2018 1407 albuterol inhalation solution 5 mg 5 mg Nebulization     01/17/2018 1445 aspirin chewable tablet 324 mg 324 mg Oral     01/17/2018 1600 vancomycin (VANCOCIN) 1,250 mg in sodium chloride 0 9 % 250 mL IVPB 1,250 mg Intravenous     01/17/2018 1511 cefepime (MAXIPIME) 2 g/50 mL dextrose IVPB 2,000 mg Intravenous     01/18/2018 0921 amLODIPine (NORVASC) tablet 5 mg 5 mg Oral     01/18/2018 0923 b complex-vitamin C-folic acid (NEPHROCAPS) capsule 1 capsule 1 capsule Oral     01/18/2018 0923 clopidogrel (PLAVIX) tablet 75 mg 75 mg Oral     01/18/2018 0922 co-enzyme Q-10 capsule 100 mg 100 mg Oral     01/18/2018 0922 nadolol (CORGARD) tablet 20 mg 20 mg Oral     01/17/2018 1909 sodium chloride 0 9 % infusion 50 mL/hr Intravenous     01/18/2018 0528 heparin (porcine) subcutaneous injection 5,000 Units 5,000 Units Subcutaneous     01/17/2018 2133 heparin (porcine) subcutaneous injection 5,000 Units 5,000 Units Subcutaneous     01/18/2018 0926 ceftriaxone (ROCEPHIN) 1 g/50 mL in dextrose IVPB 1,000 mg Intravenous     01/17/2018 2055 azithromycin (ZITHROMAX) tablet 500 mg   Oral     01/18/2018 0922 guaiFENesin (MUCINEX) 12 hr tablet 600 mg 600 mg Oral     01/18/2018 1331 levalbuterol (XOPENEX) inhalation solution 1 25 mg 1 25 mg Nebulization     01/18/2018 0939 levalbuterol (XOPENEX) inhalation solution 1 25 mg 1 25 mg Nebulization     01/17/2018 2049 levalbuterol (XOPENEX) inhalation solution 1 25 mg 1 25 mg Nebulization     01/18/2018 1331 sodium chloride 0 9 % inhalation solution 3 mL 3 mL Nebulization     01/18/2018 0939 sodium chloride 0 9 % inhalation solution 3 mL 3 mL Nebulization     01/17/2018 2049 sodium chloride 0 9 % inhalation solution 3 mL 3 mL Nebulization     01/17/2018 2133 azithromycin (ZITHROMAX) 500 mg in sodium chloride 0 9% 250mL IVPB 500 mg 500 mg Intravenous     01/17/2018 2230 sodium chloride 0 9 % bolus 1,000 mL 1,000 mL Intravenous     01/17/2018 2302 sodium chloride 0 9 % bolus 1,000 mL 1,000 mL Intravenous       Past Medical/Surgical History:       Past Medical History:    [facility]cere[facility]     Hyperlipidemia     Hypertension     Interstitial lung disease ([facility]     Oxygen dependent     Stroke [facility])        Admitting Diagnosis: Pulmonary fibrosis ([facility] ) [J84 10]  Pneumonia [J18 9]  Weakness [R53 1]  Respiratory distress [R06 00]  Septic shock ([facility] ) [A41 9, R65 21]  Acute on chronic respiratory failure with hypoxia ([facility] ) [J96 21]    Age/Sex: 80 y o  female    Assessment/Plan:     Acute on chronic respiratory failure with hypoxia (HCC)   Assessment & Plan     She uses 6 L O2 and oxymizer at home  O2 sat 97 percent currently on high-flow 60% FiO2  Likely due to pneumonia in the setting pulmonary fibrosis/end-stage lung disease  Patient will be closely monitored level 2 step-down bed  Pt is DNR/DNI       Pneumonia   Assessment & Plan     (+) Recent sick contact  Otherwise afebrile here/normal white blood cell count with no left shift  Incentive spirometry  Will appreciate Pulmonary input       Pulmonary fibrosis (HCC)   Assessment & Plan     Patient has been followed by palliative care outpt  It appears that she did not tolerate liquid morphine           Acute kidney injury ([facility] )   Assessment & Plan     MICHAEL POA  Likely pre renal due to poor oral intake/Use of ARB with questionable ATN secondary to hypoxia    Will gently hydrate and monitor Cr  Hold ARB       Fall   Assessment & Plan     PT eval       Hypertension   Assessment & Plan     Continue home meds                 Admission Orders:    STREP PNEUMONIAE  LEGIONELLA   SPUTUM CULTURE  BLOOD CULTURE X 2    BIPAP  CONSULT PULMONARY  CONSULT PALLIATIVE  CARE  REGULAR DIET   PT EVAL AND TREAT  SPEECH EVAL AND TREAT  2 GM NA DIET         Scheduled Meds:   EMS replenish medication  Does not apply Once   amLODIPine 5 mg Oral Q12H [address]   [START ON 1/19/2018] atorvastatin 40 mg Oral Once per day on Mon Wed Fri   azithromycin 500 mg Intravenous Q24H   b complex-vitamin C-folic acid 1 capsule Oral Daily   cefTRIAXone 1,000 mg Intravenous Q24H   clopidogrel 75 mg Oral Daily   co-enzyme Q-10 100 mg Oral Daily   guaiFENesin 600 mg Oral Q12H [address]   heparin (porcine) 5,000 Units Subcutaneous Q8H [address]   levalbuterol 1 25 mg Nebulization TID   melatonin 3 mg Oral HS   nadolol 20 mg Oral Q12H [address]   sodium chloride 3 mL Nebulization TID     Continuous Infusions:   sodium chloride 50 mL/hr Last Rate: 50 mL/hr (01/17/18 1909)     PRN Meds: acetaminophen    ALPRAZolam    levalbuterol    nitroglycerin    oxyCODONE    senna-docusate sodium    sodium chloride

## 2018-01-18 NOTE — ED NOTES
Dr Abimbola Burkett of [facility] made aware of repeat troponin of 0 06       Jose Bernal RN  01/18/18 2604

## 2018-01-18 NOTE — CONSULTS
Consultation - Pulmonary Medicine   Nelsy Berman 80 y o  female MRN: [id number]  Unit/Bed#: [facility] 701-01 Encounter: [id number]      Assessment:  1   UIP  Possibly inherited from her  who worked as a steel  and  many years ago from liver cancer  2  history of COPD, the patient had a history of smoking and quit 40 years ago  She does have more than 20 pack year smoking history  3   Influenza a bronchitis after recent visit from her daughter who flew from [place] and had URI symptoms  4   Possible pulmonary hypertension on top of the above  Plan:   1  Agree with ceftriaxone and azithromycin  2   Discontinue cefepime  3   Discontinue vancomycin  The patient does not have infiltrate on the chest x-ray  4   Start the patient on bronchodilators  5   I will start the patient on low-dose steroids, it will help the inflammatory changes and it will not affect the fibrotic changes as well  6   Consider treatment with OFEV or pirfenidone as an outpatient  7   The patient does not seem to be into the ascitic about treatment of either  8   Change her oxygen to nasal cannula and maintain her O2 sat between 85 to 90%  Do not attempt to correct her O2 saturation given her chronic UIP  History of Present Illness   Physician Requesting Consult: Echo Sylvester MD  Reason for Consult / Principal Problem:  Chronic respiratory failure  Hx and PE limited by:  Patient age  HPI: Anthony Murphy is a 80y o  year old female who presents with increased shortness of breath accompanied with persistent cough for the past few days  Patient recently has been visited by her daughter who was traveling from [place] and had a URI symptoms  The patient presented to the hospital requiring high-flow oxygen to maintain her O2 sat in the 90s  And was started on high-flow oxygen  The patient was tested positive for influenza a as well  Patient started initially on cefepime and vancomycin    And then changed to azithromycin and ceftriaxone  The patient denies any hemoptysis, she does have dry cough, no chest pain was reported no nausea or vomiting and no heartburn  No increased swelling in her lower extremities  Denies any diarrhea  The rest of her review of system was unremarkable except for constitutional symptoms at home  No fever reported in the ED  The patient has been followed by Pulmonary as an outpatient  She was diagnosed with interstitial lung disease for many years  The patient was smoker and quit in 1984  No recent exposure to smoking recently  No secondhand exposure as well  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  Consults    Review of Systems    Historical Information   Past Medical History:   Diagnosis Date    [facility]rebral [facility]) [facility]     Hyperlipidemia     Hypertension     Interstitial lung disease ([facility]     Oxygen dependent     wears 6 5 liter oxygen at home    Stroke [facility])      No past surgical history on file  Social History   History   Alcohol Use No     History   Drug Use No     History   Smoking Status    Former Smoker   Smokeless Tobacco    Not on file     Occupational History:  As above  Family History: non-contributory    Meds/Allergies   all current active meds have been reviewed    Allergies   Allergen Reactions    Codeine        Objective   Vitals: Blood pressure 119/56, pulse 64, temperature 97 9 °F (36 6 °C), resp  rate 18, weight 89 6 kg (197 lb 8 5 oz), SpO2 93 %  ,Body mass index is 30 94 kg/m²  Intake/Output Summary (Last 24 hours) at 01/18/18 1452  Last data filed at 01/18/18 0559   Gross per 24 hour   Intake             2550 ml   Output               20 ml   Net             2530 ml     Invasive Devices     Peripheral Intravenous Line            Peripheral IV 01/17/18 Right Antecubital 1 day                Physical Exam    Lab Results: I have personally reviewed pertinent lab results    Imaging Studies: I have personally reviewed pertinent reports  I have reviewed her chest x-ray and also the CT scan from 2014  The patient does not have any infiltrate, she does have fibrotic changes, previous CT scan showed traction bronchiectasis as well as sub pleuritic fibrotic changes consistent with diagnosis of UIP   EKG, Pathology, and Other Studies: I have personally reviewed pertinent reports      VTE Prophylaxis: Heparin    Code Status: Level 3 - DNAR and DNI  Advance Directive and Living Will:      Power of :    POLST:      None

## 2018-01-18 NOTE — PROGRESS NOTES
[facility] Internal Medicine Progress Note  Patient: Mickey Miller 80 y o  female   MRN: [id number]  PCP: Bernice Laird MD (Inactive)  Unit/Bed#: [facility] 701-01 Encounter: [id number]  Date Of Visit: 01/18/18    Assessment:    Principal Problem:    Acute on chronic respiratory failure with hypoxia ([facility] )  Active Problems:    Pulmonary fibrosis ([facility] )    Acute kidney injury ([facility] )    Pneumonia    Hypertension    Fall    Hyponatremia present on admission    Non-ST elevation myocardial infarction (NSTEMI), type 2 ([facility] )      Plan:    · Acute on chronic hypoxic respiratory failure due to influenza A infection and streptococcal pneumonia:  Continue supportive care and oxygen supplementation  Pulmonology follow-up ongoing, currently on high-flow oxygen  · Streptococcal pneumonia:  Continue ceftriaxone but increase the dose to 2 g  Pulmonary follow-up ongoing  · Influenza A infection:  Start Tamiflu  · Pulmonary fibrosis:  Pulmonology following  Started on IV steroids  Monitor  · History of COPD:  Continue respiratory protocol  · Hypertension:  Continue home regimen  · Acute kidney injury present on admission:  Holding ARB  Creatinine improving  Recheck a  · Non ST elevation myocardia infarction type 2:  Due to hypoxic respiratory failure  Monitor for now, no chest pain  Continue with Plavix and statin  · Hyponatremia present on admission:  Stay stable and improved recheck a m  · Mild anemia:  Drop in hemoglobin likely due to hydration, no signs of active bleed  Check am        VTE Pharmacologic Prophylaxis:   Pharmacologic: Heparin  Mechanical VTE Prophylaxis in Place: Yes    Patient Centered Rounds: I have performed bedside rounds with nursing staff today  Discussions with Specialists or Other Care Team Provider:  Nursing    Education and Discussions with Family / Patient:  Patient    Time Spent for Care: 30 minutes    More than 50% of total time spent on counseling and coordination of care as described above     Current Length of Stay: 1 day(s)    Current Patient Status: Inpatient   Certification Statement: The patient will continue to require additional inpatient hospital stay due to IV steroids and antibiotics    Discharge Plan / Estimated Discharge Date:  Pending improved    Code Status: Level 3 - DNAR and DNI      Subjective:   Feeling little better  Remains on high-flow oxygen    Objective:     Vitals:   Temp (24hrs), Av 6 °F (36 4 °C), Min:97 3 °F (36 3 °C), Max:97 9 °F (36 6 °C)    HR:  [44-64] 64  Resp:  [20-28] 22  BP: ()/(35-63) 119/56  SpO2:  [92 %-97 %] 93 %  Body mass index is 30 94 kg/m²  Input and Output Summary (last 24 hours): Intake/Output Summary (Last 24 hours) at 18 1715  Last data filed at 18 1500   Gross per 24 hour   Intake             2620 ml   Output               20 ml   Net             2600 ml       Physical Exam:     Physical Exam     Gen -Patient comfortable at rest  On high-flow O2  Neck- Supple  No thyromegaly or lymphadenopathy  Lungs-rhonchi, and mild wheeze   Heart S1-S2, regular rate and rhythm, no murmurs  Abdomen-soft nontender, no organomegaly  Bowel sounds present  Extremities-no cyanosi,  clubbing or edema  Skin- no rash  Neuro-nonfocal         Additional Data:     Labs:      Results from last 7 days  Lab Units 18  0526 18  1409   WBC Thousand/uL 3 18* 5 95   HEMOGLOBIN g/dL 9 7* 11 4*   HEMATOCRIT % 30 2* 35 1   PLATELETS Thousands/uL 139* 167   NEUTROS PCT %  --  64   LYMPHS PCT %  --  26   MONOS PCT %  --  10   EOS PCT %  --  0       Results from last 7 days  Lab Units 18  0526   SODIUM mmol/L 135*   POTASSIUM mmol/L 4 4   CHLORIDE mmol/L 102   CO2 mmol/L 24   BUN mg/dL 64*   CREATININE mg/dL 1 30   CALCIUM mg/dL 8 1*   GLUCOSE RANDOM mg/dL 118           * I Have Reviewed All Lab Data Listed Above  * Additional Pertinent Lab Tests Reviewed:  All Labs Within Last 24 Hours Reviewed    Imaging:    Imaging Reports Reviewed Today Include: cxr  Imaging Personally Reviewed by Myself Includes:  cxr    Recent Cultures (last 7 days):       Results from last 7 days  Lab Units 01/18/18  0556 01/17/18  1758   INFLUENZA A PCR   --  Detected*   INFLUENZA B PCR   --  None Detected   RSV PCR   --  None Detected   LEGIONELLA URINARY ANTIGEN  Negative  --        Last 24 Hours Medication List:     EMS replenish medication  Does not apply Once   amLODIPine 5 mg Oral Q12H [address]   [START ON 1/19/2018] atorvastatin 40 mg Oral Once per day on Mon Wed Fri   azithromycin 500 mg Intravenous Q24H   b complex-vitamin C-folic acid 1 capsule Oral Daily   cefTRIAXone 1,000 mg Intravenous Q24H   clopidogrel 75 mg Oral Daily   co-enzyme Q-10 100 mg Oral Daily   guaiFENesin 600 mg Oral Q12H [address]   heparin (porcine) 5,000 Units Subcutaneous Q8H [address]   levalbuterol 1 25 mg Nebulization TID   melatonin 3 mg Oral HS   methylPREDNISolone sodium succinate 40 mg Intravenous Q12H [facility]   nadolol 20 mg Oral Q12H [address]   sodium chloride 3 mL Nebulization TID        Today, Patient Was Seen By: Josetet Sandoval MD    ** Please Note: This note has been constructed using a voice recognition system   **

## 2018-01-18 NOTE — CONSULTS
Consultation - [address] [facility] PILO Berman 80 y o  female MRN: [id number]  Unit/Bed#: ED 05 Encounter: [id number]      Assessment/Plan     Assessment:  1  Acute on chronic hypoxic respiratory failure due to influenza in the setting of severe ILD  2  Anxiety due to health  3  Peripheral neuropathy  4  Generalized weakness with fall    Plan:  1  Will add back her home low dose Xanax to help with her anxiety related to her dyspnea and d/c the lorazepam as she is tolerating PO   2  She has tried Roxanol in the past with no relief, will trial Roxicodone to see if this helps with her dyspnea and for moderate to severe pain and will d/c IV morphine  3  She is a high delirium risk given age and infection  Strict precautions, I have asked her DIL to bring in her glasses  Will add low dose melatonin for maintenance of circadian rhythm  4  Goals- very clear- DNR/DNI  Okay with current care plan  We have discussed hospice in the past and pending how she progresses, we may need to re-address this  Will follow closely along  Time spent providing psychosocial support to Ms Berman  History of Present Illness   Physician Requesting Consult: Lindsey Santa MD  Reason for Consult / Principal Problem: goals  Hx and PE limited by: NA  HPI: Mark Adames is a 80y o  year old female who is well known to me as she follows in our palliative care clinic  She has a known history of ILD and is on oxygen at home  We recently had her improved for an oxymizer that could go up to 10L for when she mobilizes and she uses 6L at rest  We have had multiple conversations about hospice and the PALS program that she has always declined as she enjoys the socialization of going out to her doctor's appointment  Apparently, though, her last visit with her PCP she was open to the idea of using PALS  She tells me that her daughter was visiting from [place] and she had an URI and a cough  She started feeling ill a few days later   Initially her DIL suggested she come to the ED on Monday- but she declined as she does not want to be hospitalized  Apparently things worsened and she became weaker resulting in a fall  She came to the ED yesterday at the [facility] where she resides  She was hypoxic and hypotensive and tested positive for influenza  I spoke with her DIL-Jenni- and she says she thought she was going to die overnight by how bad she looked and was preparing herself for the phone call  Ms Opal Wang- is actually showing signs of improvement- she is currently on Hi-Dimitry but cracking jokes and eating breakfast    She understands that this is very serious given her already poor lung function but offers no complaints  She has been at peace with her mortality for some time now and continues to express the same  Inpatient consult to Palliative Care  Consult performed by: Emily Pastor  Consult ordered by: Nadeem Alarcon          Review of Systems   Constitutional: Positive for activity change  Respiratory: Positive for cough, chest tightness, shortness of breath and wheezing  Gastrointestinal: Negative for constipation, diarrhea and nausea  Psychiatric/Behavioral: The patient is nervous/anxious  Historical Information   Past Medical History:   Diagnosis Date    [facility]cere[facility]     Hyperlipidemia     Hypertension     Interstitial lung disease [facility]     Oxygen dependent     wears 6 5 liter oxygen at home    Stroke [facility])      No past surgical history on file    Social History     Social History    Marital status:      Spouse name: N/A    Number of children: N/A    Years of education: N/A     Social History Main Topics    Smoking status: Former Smoker    Smokeless tobacco: Not on file    Alcohol use No    Drug use: No    Sexual activity: Not on file     Other Topics Concern    Not on file     Social History Narrative    No narrative on file     No family history on file     Meds/Allergies   all current active meds have been reviewed and current meds:   Current Facility-Administered Medications   Medication Dose Route Frequency     EMS REPLENISHMENT MED   Does not apply Once    acetaminophen (TYLENOL) tablet 650 mg  650 mg Oral Q6H PRN    amLODIPine (NORVASC) tablet 5 mg  5 mg Oral Q12H [address]    [START ON 1/19/2018] atorvastatin (LIPITOR) tablet 40 mg  40 mg Oral Once per day on Mon Wed Fri    azithromycin (ZITHROMAX) 500 mg in sodium chloride 0 9% 250mL IVPB 500 mg  500 mg Intravenous Q24H    b complex-vitamin C-folic acid (NEPHROCAPS) capsule 1 capsule  1 capsule Oral Daily    ceftriaxone (ROCEPHIN) 1 g/50 mL in dextrose IVPB  1,000 mg Intravenous Q24H    clopidogrel (PLAVIX) tablet 75 mg  75 mg Oral Daily    co-enzyme Q-10 capsule 100 mg  100 mg Oral Daily    guaiFENesin (MUCINEX) 12 hr tablet 600 mg  600 mg Oral Q12H [address]    heparin (porcine) subcutaneous injection 5,000 Units  5,000 Units Subcutaneous Q8H [address]    levalbuterol (XOPENEX) inhalation solution 1 25 mg  1 25 mg Nebulization TID    levalbuterol (XOPENEX) inhalation solution 1 25 mg  1 25 mg Nebulization Q6H PRN    LORazepam (ATIVAN) 2 mg/mL injection 0 5 mg  0 5 mg Intravenous Q4H PRN    morphine injection 1 mg  1 mg Intravenous Q3H PRN    nadolol (CORGARD) tablet 20 mg  20 mg Oral Q12H [address]    nitroglycerin (NITROSTAT) SL tablet 0 4 mg  0 4 mg Sublingual Q5 Min PRN    sodium chloride 0 9 % infusion  50 mL/hr Intravenous Continuous    sodium chloride 0 9 % inhalation solution 3 mL  3 mL Nebulization TID    sodium chloride 0 9 % inhalation solution 3 mL  3 mL Nebulization Q6H PRN       Palliative Care Medications: NA    Allergies   Allergen Reactions    Codeine        Objective     Physical Exam   Constitutional: She is oriented to person, place, and time  She appears well-nourished  She appears distressed  Chronically ill appearing   HENT:   Head: Normocephalic and atraumatic     Right Ear: External ear normal    Left Ear: External ear normal    Nose: Nose normal    Mouth/Throat: Oropharynx is clear and moist    Eyes: EOM are normal  Right eye exhibits no discharge  Left eye exhibits no discharge  No scleral icterus  Neck: Neck supple  No JVD present  No tracheal deviation present  Cardiovascular: Normal rate, regular rhythm and intact distal pulses  Pulmonary/Chest: She is in respiratory distress  She has wheezes  She has no rales  Abdominal: Bowel sounds are normal  She exhibits no distension and no mass  There is no tenderness  Musculoskeletal: She exhibits no edema, tenderness or deformity  Neurological: She is alert and oriented to person, place, and time  No cranial nerve deficit  Coordination normal    Skin: Skin is warm and dry  She is not diaphoretic  Psychiatric: She has a normal mood and affect  Her behavior is normal  Judgment and thought content normal    Nursing note and vitals reviewed  Lab Results:   I have personally reviewed pertinent labs  , CBC:   Lab Results   Component Value Date    WBC 3 18 (L) 01/18/2018    HGB 9 7 (L) 01/18/2018    HCT 30 2 (L) 01/18/2018    MCV 89 01/18/2018     (L) 01/18/2018    MCH 28 7 01/18/2018    MCHC 32 1 01/18/2018    RDW 13 9 01/18/2018    MPV 10 0 01/18/2018    NRBC 0 01/17/2018   , CMP:   Lab Results   Component Value Date     (L) 01/18/2018    K 4 4 01/18/2018     01/18/2018    CO2 24 01/18/2018    ANIONGAP 9 01/18/2018    BUN 64 (H) 01/18/2018    CREATININE 1 30 01/18/2018    GLUCOSE 118 01/18/2018    CALCIUM 8 1 (L) 01/18/2018    EGFR 37 01/18/2018     Imaging Studies: I have personally reviewed pertinent reports  EKG, Pathology, and Other Studies: I have personally reviewed pertinent reports  Code Status: Level 3 - DNAR and DNI  Advance Directive and Living Will:      Power of :    POLST:      Counseling / Coordination of Care  Total floor / unit time spent today 70+ minutes   Greater than 50% of total time was spent with the patient and / or family counseling and / or coordination of care   A description of the counseling / coordination of care: symptom assessment, discussion with family, support

## 2018-01-18 NOTE — ED NOTES
Pt  Alert and oriented  Awake, and able to hold conversation with RN  BP now 114/58  Dr Angel Hernandez of [facility] made aware of change in patient status  No repeat ABG needed at this time  Pt  Is to be changed to high flow NC at this time  RT made aware        Elsa Grace RN  01/18/18 2699

## 2018-01-18 NOTE — ED NOTES
Report taken from RN, [facility] Transfer of care occurred at this time        Ba Ugalde RN  01/18/18 1979

## 2018-01-18 NOTE — ED NOTES
Resp in route to start breathing tx    Pt given breakfast     [place], [facility]  01/18/18 6755

## 2018-01-18 NOTE — PROGRESS NOTES
Patient admitted with acute on chronic hypercapnic hypoxic respiratory failure secondary to pneumonia , I was notified by nurse that patient dropped her blood pressure to 59/35 and heart rate to high 40s  Discussed with intensivist    Family members  ,son Georgi Mccormack  and daughter-in-law Jess Gamez [phone] were updated regarding current patient condition and poor prognosis  Despite of resuscitation fluids patient continue to drop blood pressure  Family members want the  patient to be comfortable and willing  to meet with palliative/hospice specialist   No intubation, chest compressions or vasopressors ,given DNR DNI status advanced age and underlying comorbidities patient will benefit from being seen by hospice specialist  Morphine and Ativan p r n  ordered to make the patient comfortable    Will continue IV fluids and antibiotic for now

## 2018-01-18 NOTE — ED NOTES
Dr Kaiser Jaren notified of hypotension and bradycardia  1 liter NSS bolus ordered   Critical care to see patient     Fred Lim, EULALIO  01/17/18 3074

## 2018-01-18 NOTE — ED NOTES
Pt  Assisted on/off bedpan  Wiped clean, made comfortable in bed  Specimen sent to lab for further testing        Soren Caballero RN  01/18/18 1749

## 2018-01-18 NOTE — ED NOTES
Pt given applesauce for food challenge  Pt tolerated well, able to feed herself, no cough, choke or change in voice quality    [facility] aware and will order diet     Daiana Singh RN  01/18/18 3172

## 2018-01-18 NOTE — ED NOTES
Dr Vin Adair with [facility] made aware of change in neuro assessment  ABG to be ordered       Shannan Willams, RN  01/17/18 2046

## 2018-01-19 PROBLEM — J10.1 INFLUENZA A: Status: ACTIVE | Noted: 2018-01-01

## 2018-01-19 PROBLEM — J13 PNEUMONIA DUE TO STREPTOCOCCUS PNEUMONIAE (HCC): Status: ACTIVE | Noted: 2018-01-01

## 2018-01-19 NOTE — SOCIAL WORK
Met with pt and explained CM role  Pt lives at [place] alone with [address]  Pt was independent PTA and does not drive  Pt has has a  for her transportation  Pt's PCP is Dr Annel Benavidez  Pt has oxygen at home from [place]  No hx of HHC or rehab  No  Hx of mental health issues or drug use  Pt has a prescription plan and uses [facility] on [address] in [place] for her prescriptions  Primary contact is pt's son Darshana Coto, contact # h) [phone] or w) [phone]  Pt's  will provide pt with transportation home upon discharge  Pt stated that when she goes home [employer] Gloria, her private HHA that comes MW to assist with household chores, will be available to assist her  CM reviewed d/c planning process including the following: identifying help at home, patient preference for d/c planning needs, Discharge Lounge, [facility] Meds to Bed program, availability of treatment team to discuss questions or concerns patient and/or family may have regarding understanding medications and recognizing signs and symptoms once discharged  CM also encouraged patient to follow up with all recommended appointments after discharge  Patient advised of importance for patient and family to participate in managing patients medical well being

## 2018-01-19 NOTE — PROGRESS NOTES
Progress note - Palliative and Supportive Care   Carlos Enrique Ireland 80 y o  female [id number]    Assessment:  1  Acute on chronic hypoxic respiratory failure due to influenza in the setting of severe ILD  2  Anxiety due to health  3  Peripheral neuropathy  4  Generalized weakness with fall    Plan:  1  Continue low dose Xanax for anxiety related to her dyspnea  2  Goals - Pending clinical improvement  She is DNR/DNI, but she is very uncomfortable with BiPAP- for now she is okay to continue with BiPAP if needed  3  Continue Delirium precautions     Code Status: DNR/DNI - Level 3   Power of :  presumed to be Paulina Wang by [place] Act 169   Advance Directive / Living Will: yes   POLST:  yes      Interval history:       Patient currently on BiPAP and very upset with this  She finds it very uncomfortable  She is unsure if she wants to continue accepting it as an intervention in her care       MEDICATIONS / ALLERGIES:    all current active meds have been reviewed and current meds:   Current Facility-Administered Medications   Medication Dose Route Frequency     EMS REPLENISHMENT MED   Does not apply Once    acetaminophen (TYLENOL) tablet 650 mg  650 mg Oral Q6H PRN    ALPRAZolam (XANAX) tablet 0 25 mg  0 25 mg Oral TID PRN    amLODIPine (NORVASC) tablet 5 mg  5 mg Oral Q12H [facility]    atorvastatin (LIPITOR) tablet 40 mg  40 mg Oral Once per day on Mon Wed Fri    azithromycin (ZITHROMAX) 500 mg in sodium chloride 0 9% 250mL IVPB 500 mg  500 mg Intravenous Q24H    b complex-vitamin C-folic acid (NEPHROCAPS) capsule 1 capsule  1 capsule Oral Daily    cefTRIAXone (ROCEPHIN) IVPB (premix) 2,000 mg  2,000 mg Intravenous Q24H    clopidogrel (PLAVIX) tablet 75 mg  75 mg Oral Daily    co-enzyme Q-10 capsule 100 mg  100 mg Oral Daily    furosemide (LASIX) injection 40 mg  40 mg Intravenous BID (diuretic)    guaiFENesin (MUCINEX) 12 hr tablet 600 mg  600 mg Oral Q12H [facility]    heparin (porcine) subcutaneous injection 5,000 Units 5,000 Units Subcutaneous Q8H [facility]    levalbuterol (XOPENEX) inhalation solution 1 25 mg  1 25 mg Nebulization TID    levalbuterol (XOPENEX) inhalation solution 1 25 mg  1 25 mg Nebulization Q6H PRN    melatonin tablet 3 mg  3 mg Oral HS    methylPREDNISolone sodium succinate (Solu-MEDROL) injection 40 mg  40 mg Intravenous Q6H [facility]    nadolol (CORGARD) tablet 20 mg  20 mg Oral Q12H [facility]    nitroglycerin (NITROSTAT) SL tablet 0 4 mg  0 4 mg Sublingual Q5 Min PRN    oseltamivir (TAMIFLU) capsule 30 mg  30 mg Oral Q12H [facility]    oxyCODONE (ROXICODONE) oral solution 2 5 mg  2 5 mg Oral Q4H PRN    senna-docusate sodium (SENOKOT S) 8 6-50 mg per tablet 1 tablet  1 tablet Oral Daily PRN    sodium chloride 0 9 % infusion  50 mL/hr Intravenous Continuous    sodium chloride 0 9 % inhalation solution 3 mL  3 mL Nebulization TID    sodium chloride 0 9 % inhalation solution 3 mL  3 mL Nebulization Q6H PRN       Allergies   Allergen Reactions    Codeine        OBJECTIVE:    Physical Exam  Physical Exam   Constitutional: No distress  HENT:   Head: Normocephalic and atraumatic  Right Ear: External ear normal    Left Ear: External ear normal    Eyes: Right eye exhibits no discharge  Left eye exhibits no discharge  Cardiovascular: Normal rate  Pulmonary/Chest:   Poor air movement, on BiPAP   Abdominal: Soft  She exhibits no distension  Musculoskeletal: She exhibits no edema  Skin: There is pallor  Psychiatric:   Currently anxious   Nursing note and vitals reviewed  Lab Results: I have personally reviewed pertinent labs  , CBC: No results found for: WBC, HGB, HCT, MCV, PLT, ADJUSTEDWBC, MCH, MCHC, RDW, MPV, NRBC, CMP: No results found for: NA, K, CL, CO2, ANIONGAP, BUN, CREATININE, GLUCOSE, CALCIUM, AST, ALT, ALKPHOS, PROT, ALBUMIN, BILITOT, EGFR  Imaging Studies: reviewed  EKG, Pathology, and Other Studies: reviewed    Counseling / Coordination of Care  Total floor / unit time spent today 25 minutes   Greater than 50% of total time was spent with the patient and / or family counseling and / or coordination of care   A description of the counseling / coordination of care: supportive listening

## 2018-01-19 NOTE — PROGRESS NOTES
[facility] Internal [facility] Progress Note  Patient: Arnaldo Camargo 80 y o  female   MRN: [id number]  PCP: Joyce Adams DO  Unit/Bed#: [facility] 701-01 Encounter: [id number]  Date Of Visit: 01/19/18    Assessment:    Principal Problem:    Acute on chronic respiratory failure with hypoxia ([facility] )  Active Problems:    Pulmonary fibrosis ([facility] )    Acute kidney injury ([facility] )    Pneumonia due to Streptococcus pneumoniae ([facility] )    Hypertension    Fall    Hyponatremia present on admission    Non-ST elevation myocardial infarction (NSTEMI), type 2 ([facility] )    Influenza A      Plan:    · Pneumococcal pneumonia:  Continue high-dose IV ceftriaxone  Blood cultures so far negative  · Acute on chronic hypoxic respiratory failure:  Continue BiPAP therapy/high-flow oxygen as needed  Pulmonology follow-up ongoing  · Influenza A infection:  Continue Tamiflu  Respiratory protocol  · Pulmonary fibrosis:  Outpatient follow-up  · History of COPD:  Continue BiPAP as needed  Pulmonology following  · Acute kidney injury present on admission:  Improving  · Pulmonary edema noted on chest x-ray:  Continue Lasix as ordered  Recheck BMP a m   · Non ST-elevation myocardial infarction type 2:  Due to hypoxic respiratory failure pneumonia  Continue to monitor   Currently no chest pain  Continue statin and Plavix  · Mild hyponatremia:  Stable  · Anemia:  Drop in hemoglobin likely due to dilutional effect  · Leukopenia and thrombocytopenia:  Likely viral etiology  Monitor  · Hypertension:  Currently controlled       VTE Pharmacologic Prophylaxis:   Pharmacologic: Heparin  Mechanical VTE Prophylaxis in Place: Yes    Patient Centered Rounds: I have performed bedside rounds with nursing staff today  Discussions with Specialists or Other Care Team Provider:  Nursing    Education and Discussions with Family / Patient:  Updated taco Ligia Ayala    Time Spent for Care: 20 minutes    More than 50% of total time spent on counseling and coordination of care as described above     Current Length of Stay: 2 day(s)    Current Patient Status: Inpatient   Certification Statement: The patient will continue to require additional inpatient hospital stay due to IV antibiotics and hypoxia    Discharge Plan / Estimated Discharge Date:  Pending clinical improvement    Code Status: Level 3 - DNAR and DNI      Subjective:   Lethargic today  Placed on BiPAP overnight  Objective:     Vitals:   Temp (24hrs), Av 1 °F (36 7 °C), Min:97 5 °F (36 4 °C), Max:98 9 °F (37 2 °C)    HR:  [59-68] 59  Resp:  [18-36] 22  BP: (123-189)/(58-99) 152/65  SpO2:  [60 %-99 %] 95 %  Body mass index is 30 94 kg/m²  Input and Output Summary (last 24 hours): Intake/Output Summary (Last 24 hours) at 18 1447  Last data filed at 18 1349   Gross per 24 hour   Intake             1510 ml   Output              464 ml   Net             1046 ml       Physical Exam:     Physical Exam     Gen -Patient on BiPAP  Neck- Supple  No thyromegaly or lymphadenopathy  Lungs-crackles at bases  Heart S1-S2, regular rate and rhythm, no murmurs  Abdomen-soft nontender, no organomegaly  Bowel sounds present  Extremities-no cyanosi,  clubbing or edema  Skin- no rash  Neuro-lethargic, follows simple commands, moving all 4 extremities         Additional Data:     Labs:      Results from last 7 days  Lab Units 18  0526 18  1409   WBC Thousand/uL 3 18* 5 95   HEMOGLOBIN g/dL 9 7* 11 4*   HEMATOCRIT % 30 2* 35 1   PLATELETS Thousands/uL 139* 167   NEUTROS PCT %  --  64   LYMPHS PCT %  --  26   MONOS PCT %  --  10   EOS PCT %  --  0       Results from last 7 days  Lab Units 18  0526   SODIUM mmol/L 135*   POTASSIUM mmol/L 4 4   CHLORIDE mmol/L 102   CO2 mmol/L 24   BUN mg/dL 64*   CREATININE mg/dL 1 30   CALCIUM mg/dL 8 1*   GLUCOSE RANDOM mg/dL 118           * I Have Reviewed All Lab Data Listed Above  * Additional Pertinent Lab Tests Reviewed:  All Labs Within Last 24 Hours Reviewed    Imaging:    Imaging Reports Reviewed Today Include:   Imaging Personally Reviewed by Myself Includes:      Recent Cultures (last 7 days):       Results from last 7 days  Lab Units 01/18/18  0556 01/17/18  1758 01/17/18  1508 01/17/18  1500   BLOOD CULTURE   --   --  No Growth at 24 hrs  No Growth at 24 hrs  INFLUENZA A PCR   --  Detected*  --   --    INFLUENZA B PCR   --  None Detected  --   --    RSV PCR   --  None Detected  --   --    LEGIONELLA URINARY ANTIGEN  Negative  --   --   --        Last 24 Hours Medication List:     EMS replenish medication  Does not apply Once   amLODIPine 5 mg Oral Q12H [address]   atorvastatin 40 mg Oral Once per day on Mon Wed Fri   azithromycin 500 mg Intravenous Q24H   b complex-vitamin C-folic acid 1 capsule Oral Daily   cefTRIAXone 2,000 mg Intravenous Q24H   clopidogrel 75 mg Oral Daily   co-enzyme Q-10 100 mg Oral Daily   furosemide 40 mg Intravenous BID (diuretic)   guaiFENesin 600 mg Oral Q12H [address]   heparin (porcine) 5,000 Units Subcutaneous Q8H [address]   levalbuterol 1 25 mg Nebulization TID   melatonin 3 mg Oral HS   methylPREDNISolone sodium succinate 40 mg Intravenous Q6H [facility]   nadolol 20 mg Oral Q12H [facility]   oseltamivir 30 mg Oral Q12H [address]   sodium chloride 3 mL Nebulization TID        Today, Patient Was Seen By: Carmen Nix MD    ** Please Note: This note has been constructed using a voice recognition system   **

## 2018-01-19 NOTE — RESPIRATORY THERAPY NOTE
RT Ventilator Management Note  Eriberto Martin 80 y o  female MRN: [id number]  Unit/Bed#: [facility] 701-01 Encounter: [id number]      Daily Screen     No data found  Physical Exam:   Assessment Type: Assess only  General Appearance: Awake  Respiratory Pattern: Labored, Accessory muscle use, Dyspnea at rest, Tachypneic  Bilateral Breath Sounds: Coarse  Cough: Non-productive      Resp Comments: Pt in resp distress  Tachypneic, dyspneic, SpO2 86% on HFNC 100%FIO2  Placed on BiPAP for resp distress

## 2018-01-19 NOTE — RESTORATIVE TECHNICIAN NOTE
Restorative Specialist Mobility Note       Activity:  (Pt is on hold for oob per RN Nigel Patel )              Repositioned: Other (Comment) (Rep /sat pt upright in cardiac chair position  Bed alarm on   Pt callbell, phone/tray within reach )           Range of Motion: All extremities (PROM exercises at bedlevel)       Rehan Decker BS, Restorative Technician,

## 2018-01-19 NOTE — PROGRESS NOTES
Progress Note - Pulmonary   Craige Lax 80 y o  female MRN: [id number]  Unit/Bed#: [facility] 701-01 Encounter: [id number]    Assessment:  1   UIP, currently the patient in exacerbation  2   History of COPD  3   Influenza A bronchitis  4   Pulmonary hypertension which is compromising her peripheral perfusion the likely related to her Xavier  5   Possible superimposed streptococcal infection  6   Non ST elevation MI  Plan:  1  Continue ceftriaxone and azithromycin  2   Continue with steroids at the current dose  3   The patient appeared to be more lethargic today, possibly she is retaining CO2, agree with BiPAP for now  4   Titrate oxygen to keep O2 sat 85 to 90%  5   Continue bronchodilators  6   Will initiate diuresis  Chief Complaint:   The patient is too lethargic to perform review of system  Subjective:   As above  Objective:     Vitals: Blood pressure (!) 189/99, pulse 66, temperature 98 6 °F (37 °C), temperature source Axillary, resp  rate 22, height 5' 7" (1 702 m), weight 89 6 kg (197 lb 8 5 oz), SpO2 94 %  ,Body mass index is 30 94 kg/m²  Intake/Output Summary (Last 24 hours) at 01/19/18 1214  Last data filed at 01/19/18 1200   Gross per 24 hour   Intake              970 ml   Output              225 ml   Net              745 ml       Invasive Devices     Peripheral Intravenous Line            Peripheral IV 01/18/18 Right Forearm less than 1 day                Physical Exam:  Vital signs are stable on the BiPAP, scattered crackles, positive JVP, heart examination S1-S2 regular rate and rhythm, edema in the periphery  Labs: I have personally reviewed pertinent lab results  Imaging and other studies: I have personally reviewed pertinent reports  Reviewed the chest x-ray personally, fibrotic changes were noted, bilateral infiltrates, and worsening congestive heart failure

## 2018-01-20 NOTE — PROGRESS NOTES
[facility] Internal Medicine Progress Note  Patient: Venkatesh Long 80 y o  female   MRN: [id number]  PCP: Alana Hawkins DO  Unit/Bed#: [facility] 701-01 Encounter: [id number]  Date Of Visit: 01/20/18    Assessment/Plan:  · Acute on chronic respiratory failure with hypoxia - in the setting of acute infection with pneumococcal pneumonia, influenza A with underlying history of pulmonary fibrosis  It appears she did not tolerate BiPAP and is now on high-flow O2 which she is uncomfortable with  Management per Pulmonary  · Pneumococcal pneumonia - continue high-dose ceftriaxone #2 (total antibiotic days #3)  Discontinue azithromycin  · Influenza A infection - continue Tamiflu #3/5  · Pulmonary fibrosis - at baseline, patient is on 6 L supplemental O2 via nasal cannular at home  Overall prognosis is poor  Continue IV steroids, nebs, supplemental O2  Management per Pulmonary  · MICHAEL - resolved  · Pulmonary edema - noted on chest x-ray  She is status post 2 doses IV Lasix 40 mg yesterday  · NSTEMI type 2- secondary to hypoxic respiratory failure  Continue statin, Plavix, beta-blocker  No further workup at this time  · Essential hypertension - continue medications  · Leukopenia/thrombocytopenia - likely secondary to viral infection and is currently resolved    VTE Pharmacologic Prophylaxis:   Pharmacologic: Heparin  Mechanical VTE Prophylaxis in Place: Yes    Patient Centered Rounds: I have performed bedside rounds with nursing staff today      Discussions with Specialists or Other Care Team Provider: Nursing    Education and Discussions with Family / Patient: Patient    Current Length of Stay: 3 day(s)    Current Patient Status: Inpatient   Certification Statement: The patient will continue to require additional inpatient hospital stay due to Respiratory failure, pneumonia, influenza A infection    Discharge Plan:  Pending clinical improvement    Code Status: Level 3 - DNAR and DNI      Subjective:   Patient seen and evaluated  She is alert and appropriately responsive  Uncomfortable with high-flow O2  Pleasant  Objective:     Vitals:   Temp (24hrs), Av 9 °F (36 6 °C), Min:97 5 °F (36 4 °C), Max:98 6 °F (37 °C)    HR:  [55-75] 60  Resp:  [18-24] 24  BP: (120-172)/(59-80) 149/75  SpO2:  [92 %-97 %] 95 %  Body mass index is 30 94 kg/m²  Input and Output Summary (last 24 hours): Intake/Output Summary (Last 24 hours) at 18 1518  Last data filed at 18 1300   Gross per 24 hour   Intake              480 ml   Output             1405 ml   Net             -925 ml       Physical Exam:  General Appearance:    Alert, cooperative, no distress, appropriately responsive    Head:    Normocephalic, without obvious abnormality, atraumatic, mucous membranes moist    Eyes:    Conjunctiva/corneas clear, EOM's intact   Neck:   Supple   Lungs:     Decreased breath sounds bilaterally with bibasal rales and diffuse end-expiratory wheeze, no accessory muscle use, respirations unlabored    Heart:    Regular rate and rhythm, S1 and S2    Abdomen:     Soft, non-tender, bowel sounds active all four quadrants,     no masses, no organomegaly   Extremities:   No edema   Neurologic:  Alert and oriented x3, following commands, moves all extremities         Additional Data:     Labs:      Results from last 7 days  Lab Units 18  0435  18  1409   WBC Thousand/uL 7 05  < > 5 95   HEMOGLOBIN g/dL 10 4*  < > 11 4*   HEMATOCRIT % 32 9*  < > 35 1   PLATELETS Thousands/uL 196  < > 167   NEUTROS PCT %  --   --  64   LYMPHS PCT %  --   --  26   LYMPHO PCT % 2*  --   --    MONOS PCT %  --   --  10   MONO PCT MAN % 3*  --   --    EOS PCT %  --   --  0   EOSINO PCT MANUAL % 0  --   --    < > = values in this interval not displayed      Results from last 7 days  Lab Units 18  0435   SODIUM mmol/L 137   POTASSIUM mmol/L 4 5   CHLORIDE mmol/L 103   CO2 mmol/L 31   BUN mg/dL 37*   CREATININE mg/dL 0 77   CALCIUM mg/dL 8 7   GLUCOSE RANDOM mg/dL 191*           * I Have Reviewed All Lab Data Listed Above  * Additional Pertinent Lab Tests Reviewed: Pashaingjuan manuel 66 Admission Reviewed    Cultures:   Blood Culture:   Lab Results   Component Value Date    BLOODCX No Growth at 48 hrs  01/17/2018    BLOODCX No Growth at 48 hrs  01/17/2018     Urine Culture: No results found for: URINECX  Sputum Culture: No components found for: SPUTUMCX  Wound Culture: No results found for: WOUNDCULT    Last 24 Hours Medication List:     EMS replenish medication  Does not apply Once   amLODIPine 5 mg Oral Q12H [address]   atorvastatin 40 mg Oral Once per day on Mon Wed Fri   azithromycin 500 mg Intravenous Q24H   b complex-vitamin C-folic acid 1 capsule Oral Daily   cefTRIAXone 2,000 mg Intravenous Q24H   clopidogrel 75 mg Oral Daily   co-enzyme Q-10 100 mg Oral Daily   guaiFENesin 600 mg Oral Q12H [address]   heparin (porcine) 5,000 Units Subcutaneous Q8H [address]   levalbuterol 1 25 mg Nebulization TID   melatonin 3 mg Oral HS   methylPREDNISolone sodium succinate 40 mg Intravenous Q6H STEVE   nadolol 20 mg Oral Q12H STEVE   oseltamivir 30 mg Oral Q12H [address]   sodium chloride 3 mL Nebulization TID        Today, Patient Was Seen By: Umberto Hua MD    ** Please Note: Dragon 360 Dictation voice to text software may have been used in the creation of this document   **

## 2018-01-20 NOTE — PLAN OF CARE
DISCHARGE PLANNING     Discharge to home or other facility with appropriate resources Progressing        DISCHARGE PLANNING - CARE MANAGEMENT     Discharge to post-acute care or home with appropriate resources Progressing        INFECTION - ADULT     Absence or prevention of progression during hospitalization Progressing        Knowledge Deficit     Patient/family/caregiver demonstrates understanding of disease process, treatment plan, medications, and discharge instructions Progressing        METABOLIC, FLUID AND ELECTROLYTES - ADULT     Electrolytes maintained within normal limits Progressing        PAIN - ADULT     Verbalizes/displays adequate comfort level or baseline comfort level Progressing        Potential for Falls     Patient will remain free of falls Progressing        Prexisting or High Potential for Compromised Skin Integrity     Skin integrity is maintained or improved Progressing        RESPIRATORY - ADULT     Achieves optimal ventilation and oxygenation Progressing        SAFETY ADULT     Patient will remain free of falls Progressing        SKIN/TISSUE INTEGRITY - ADULT     Skin integrity remains intact Progressing

## 2018-01-21 NOTE — PROGRESS NOTES
Breathing is shallow, O2 sat not picking up by monitor, Dr Bradley seen pt  O2 changed for O2 n/c for comfort

## 2018-01-21 NOTE — PROGRESS NOTES
PCA was changing patient when the patient's continuous pulse O2 started alarming  SPO2 dropped down to mid seventies  Once the patient calmed down and the saturation was back to the 90s, pt was given night time meds, moments later she coughed up red mucus  SLIM paged and saw patient  At this time she will have a NPO diet, repeat CXR, and speech eval was ordered

## 2018-01-21 NOTE — PROGRESS NOTES
Notified by RN that patient had red-tinged mucus after having red jello and night time meds  Evaluated pt who is on high-flow O2, VS SpO2 93%, /90, HR 71, Temp 97 7  Pt states she feels short of breath and has been for a while  Pt is being treated for acute on chronic respiratory failure 2/2 to pneumonia, influenza A, pulmonary fibrosis  Exam reveals decreased breath sounds bilateral with crackles  Heart RRR, abdomen soft non-tender  Oropharynx without erythma  Concern for possible aspiration as pt had red-tinged mucus after eating red jello  Obtain repeat CXR, NPO until evaluated by speech

## 2018-01-21 NOTE — RESTORATIVE TECHNICIAN NOTE
Restorative Specialist Mobility Note          Repositioned: Other (Comment) (Rep /sat pt upright in bed  Bed alarm on  Pt callbell, phone/tray within reach  High Flow NC O2 on   Continuous pulse oximeter on )           Range of Motion: All extremities (PROM exercises at bedlevel)

## 2018-01-21 NOTE — PROGRESS NOTES
Patient evaluated  She is unresponsive to verbal or tactile stimulus  There is no palpable pulse  Heart sounds are absent, no spontaneous respirations, breath sounds absent  Patient pronounced dead at 11:35 a m  on 1/21/18  Family present at the bedside and declined autopsy  Plans are for cremation

## 2018-01-21 NOTE — PROGRESS NOTES
Progress Note - Pulmonary   Tod Last 80 y o  female MRN: [id number]  Unit/Bed#: [facility] 701-01 Encounter: [id number]    Assessment:  1   UIP, in exacerbation, improving slowly  2   History of COPD  3   Influenza A bronchitis  4   Pulmonary hypertension secondary to above WHO class 3   5   Non ST elevation MI secondary to hypoxia, type 2   6   Streptococcal infection  Plan:  1  Continue ceftriaxone and azithromycin   2   Continue with current steroids dose  3   Titrate FiO2 by 10% every 2 hours, once FiO2 is less than 50% the patient can be changed to 6 L via nasal cannula  4   Bronchodilators       Chief Complaint:   The patient continued to have increasing shortness of breath, she denies any chest pain  She does have cough but no sputum production  No hemoptysis reported  The rest of her review of system was unremarkable  The patient remains confused and walking weaning in her mental status  Subjective:   As above  Objective:     Vitals: Blood pressure (!) 184/90, pulse 71, temperature 97 7 °F (36 5 °C), temperature source Oral, resp  rate 20, height 5' 7" (1 702 m), weight 89 6 kg (197 lb 8 5 oz), SpO2 93 %  ,Body mass index is 30 94 kg/m²  Intake/Output Summary (Last 24 hours) at 01/20/18 2134  Last data filed at 01/20/18 1940   Gross per 24 hour   Intake              540 ml   Output              953 ml   Net             -413 ml       Invasive Devices     Peripheral Intravenous Line            Peripheral IV 01/18/18 Right Forearm 2 days                Physical Exam:  Vital signs are stable, O2 saturation 94% on high FiO2  Bilateral crackles  S1-S2 regular rate and rhythm  Abdomen is benign no edema  Labs: I have personally reviewed pertinent lab results  Imaging and other studies: I have personally reviewed pertinent reports

## 2018-01-21 NOTE — DISCHARGE SUMMARY
Discharge Summary - [facility] Internal Medicine    Patient Information: Reno Arevalo 80 y o  female MRN: [id number]  Unit/Bed#: [facility] 701-01 Encounter: [id number]    Discharging Physician / Practitioner: Socorro Peres MD  PCP: Jaida Plascencia DO  Admission Date: 2018  Discharge Date: 18    Reason for Admission:  Respiratory failure    Discharge Diagnoses:     Principal Problem:    Acute on chronic respiratory failure with hypoxia [facility])  Active Problems:    Pulmonary fibrosis ([facility] [address] )    [facility] ([facility] )    Pneumonia due to Streptococcus pneumoniae ([facility] )    Hypertension    Fall    Hyponatremia present on admission    Non-ST elevation myocardial infarction (NSTEMI), type 2 ([facility] )    Influenza A      Consultations During Hospital Stay:  · Pulmonology  · Palliative care    Procedures Performed:   · None    Significant findings:  · Chest x-ray - bilateral infiltrates, vascular congestion    Hospital Course:   Reno Arevalo is a 80 y o  female patient who originally presented to the hospital on 2018 due to worsening shortness of breath  Patient has an underlying history of pulmonary fibrosis on 6 L supplemental O2 via nasal cannula at baseline  She presented with acute on chronic respiratory failure and was diagnosed with strep pneumonia as well as influenza A infection  She was started on IV antibiotics with ceftriaxone/azithromycin as well as IV steroids  She initially required BiPAP however she did not tolerate this and was placed on high-flow O2  Patient's respiratory status declined and on 18 she was made comfort measures only per her wishes and family wishes and subsequently   Patient was pronounced dead at 11:35 a m  on 18  Family was present at the bedside      Condition at Discharge: stable     Discharge Day Visit / Exam:   See same day progress note       Discharge instructions/Information to patient and family:   See after visit summary for information provided to patient and family  Provisions for Follow-Up Care:  See after visit summary for information related to follow-up care and any pertinent home health orders  Disposition:       Discharge Statement:  I spent >50 minutes discharging the patient  This time was spent on the day of discharge  I had direct contact with the patient on the day of discharge  Greater than 50% of the total time was spent examining patient, answering all patient questions, arranging and discussing plan of care with patient as well as directly providing family updates/counseling  Additional time then spent on discharge activities  Discharge Medications:  See after visit summary for reconciled discharge medications provided to patient and family  ** Please Note: Dragon 360 Dictation voice to text software may have been used in the creation of this document   **

## 2018-01-21 NOTE — PROGRESS NOTES
Met with patient's children at the bedside  They were updated on decline in clinical/respiratory status  Following discussions, family has chosen comfort measures only  This is what the patient wanted as she had expressed no heroic measures to be done when she was admitted  They are okay with continuing IV antibiotics at this time

## 2018-01-21 NOTE — PROGRESS NOTES
Pt is anxious, restless, O2 sat 84-86% on high flow oxygen  Slim seen pt, ativan, lasix iv given as ordered, will continue to monitor pt

## 2018-01-21 NOTE — PROGRESS NOTES
[facility] Internal Medicine Progress Note  Patient: Yao Pack 80 y o  female   MRN: [id number]  PCP: Sparkle Magallon DO  Unit/Bed#: [facility] 701-01 Encounter: [id number]  Date Of Visit: 01/21/18    Assessment/Plan:  · Acute on chronic respiratory failure with hypoxia - in the setting of acute infection with pneumococcal pneumonia, influenza A with underlying history of pulmonary fibrosis  Patient took of her high-flow O2 this morning as she was anxious and not tolerating this  She did receive Ativan IV x1 dose and we were able to get her high-flow back on with improved sats and decrease tachypnea  · Pneumococcal pneumonia - continue high-dose ceftriaxone #3 (total antibiotic days #3)  · Influenza A infection - continue Tamiflu #4/5  · Pulmonary fibrosis - at baseline, patient is on 6 L supplemental O2 via nasal cannular at home  Overall prognosis is poor  Continue IV steroids, nebs, supplemental O2  Management per Pulmonary  · Generalized anxiety - continue p r n  benzo   · MICHAEL - resolved  IV fluids discontinued  · Pulmonary edema - noted on chest x-ray  Repeat chest x-ray overnight essentially unchanged  She will receive additional dose IV Lasix 40 mg x1 today  · NSTEMI type 2- secondary to hypoxic respiratory failure  Continue statin, Plavix, beta-blocker  No further workup at this time  · Essential hypertension - continue medications  · Leukopenia/thrombocytopenia - likely secondary to viral infection and is resolved    Addendum 10:10AM - the patient with declining respiratory status, O2 sats and bradycardia  She is now unresponsive with Cheyne-Vieira respirations  I discussed with patient's daughter Pierce Yañez and updated her on development  Family will be making their way down to the hospital shortly  Patient will be kept comfortable with no resuscitative measures per her wishes      VTE Pharmacologic Prophylaxis:   Pharmacologic: Heparin  Mechanical VTE Prophylaxis in Place: Yes    Patient Centered Rounds: I have performed bedside rounds with nursing staff today  Discussions with Specialists or Other Care Team Provider: Nursing    Education and Discussions with Family / Patient: Patient/discussed with patient's daughter Kenisha Pugh on phone  She was updated on clinical status and clear plan, all questions answered  Current Length of Stay: 4 day(s)    Current Patient Status: Inpatient   Certification Statement: The patient will continue to require additional inpatient hospital stay due to Respiratory failure, pneumonia, influenza A infection    Discharge Plan:  Pending clinical improvement    Code Status: Level 3 - DNAR and DNI      Subjective:   Patient sitting at edge of bed on walking into the room this morning  She had her high-flow O2 off and was very restless  She denied pain but expressed trouble breathing  No fever or chills    Objective:     Vitals:   Temp (24hrs), Av 8 °F (36 6 °C), Min:97 6 °F (36 4 °C), Max:98 °F (36 7 °C)    HR:  [60-73] 69  Resp:  [20-24] 22  BP: (149-184)/(69-90) 159/69  SpO2:  [90 %-95 %] 91 %  Body mass index is 30 94 kg/m²  Input and Output Summary (last 24 hours):        Intake/Output Summary (Last 24 hours) at 18 0914  Last data filed at 18 0546   Gross per 24 hour   Intake              240 ml   Output             1092 ml   Net             -852 ml       Physical Exam:  General Appearance:    Alert, cooperative, no distress, appropriately responsive    Head:    Normocephalic, without obvious abnormality, atraumatic, mucous membranes moist    Eyes:   Conjunctiva/corneas clear, EOM's intact   Neck:   Supple   Lungs:     Bilateral crackles worse at the bases with end-expiratory wheeze, tachypneic but without accessory muscle use     Heart:    Regular rate and rhythm, S1 and S2    Abdomen:     Soft, non-tender, nondistended   Extremities:   No edema   Neurologic:  Distressed and very restless/anxious, moving all extremities, oriented x3 Additional Data:     Labs:      Results from last 7 days  Lab Units 01/20/18  0435  01/17/18  1409   WBC Thousand/uL 7 05  < > 5 95   HEMOGLOBIN g/dL 10 4*  < > 11 4*   HEMATOCRIT % 32 9*  < > 35 1   PLATELETS Thousands/uL 196  < > 167   NEUTROS PCT %  --   --  64   LYMPHS PCT %  --   --  26   LYMPHO PCT % 2*  --   --    MONOS PCT %  --   --  10   MONO PCT MAN % 3*  --   --    EOS PCT %  --   --  0   EOSINO PCT MANUAL % 0  --   --    < > = values in this interval not displayed  Results from last 7 days  Lab Units 01/20/18  0435   SODIUM mmol/L 137   POTASSIUM mmol/L 4 5   CHLORIDE mmol/L 103   CO2 mmol/L 31   BUN mg/dL 37*   CREATININE mg/dL 0 77   CALCIUM mg/dL 8 7   GLUCOSE RANDOM mg/dL 191*           * I Have Reviewed All Lab Data Listed Above  * Additional Pertinent Lab Tests Reviewed:  All Labs Within Last 24 Hours Reviewed    Cultures:   Blood Culture:   Lab Results   Component Value Date    BLOODCX No Growth at 72 hrs  01/17/2018    BLOODCX No Growth at 72 hrs  01/17/2018     Urine Culture: No results found for: URINECX  Sputum Culture: No components found for: SPUTUMCX  Wound Culture: No results found for: WOUNDCULT    Last 24 Hours Medication List:     EMS replenish medication  Does not apply Once   amLODIPine 5 mg Oral Q12H [address]   atorvastatin 40 mg Oral Once per day on Mon Wed Fri   b complex-vitamin C-folic acid 1 capsule Oral Daily   cefTRIAXone 2,000 mg Intravenous Q24H   clopidogrel 75 mg Oral Daily   co-enzyme Q-10 100 mg Oral Daily   guaiFENesin 600 mg Oral Q12H [address]   heparin (porcine) 5,000 Units Subcutaneous Q8H [address]   levalbuterol 1 25 mg Nebulization TID   melatonin 3 mg Oral HS   methylPREDNISolone sodium succinate 40 mg Intravenous Q6H STEVE   nadolol 20 mg Oral Q12H [address]   oseltamivir 30 mg Oral Q12H STEVE   prednisoLONE acetate 1 drop Left Eye 4x Daily   sodium chloride 3 mL Nebulization TID        Today, Patient Was Seen By: Karely Beck MD    ** Please Note: Dragon 360 Dictation voice to text software may have been used in the creation of this document   **

## 2018-01-21 NOTE — SPEECH THERAPY NOTE
Speech Language/Pathology    Speech consult received for 79 y/o female admitted to [facility] on 1/17/2018 with acute on chronic respiratory failure  Pt had episode of red tinged mucous after having jello and there was a concern for aspiration  SLP arrived to room and spoke with nurse  Nurse reported pt is too lethargic at this time as she was just given ativan  Will attempt to f/u later today as schedule permits

## 2018-01-22 VITALS
HEIGHT: 67 IN | RESPIRATION RATE: 20 BRPM | TEMPERATURE: 98 F | HEART RATE: 58 BPM | SYSTOLIC BLOOD PRESSURE: 158 MMHG | DIASTOLIC BLOOD PRESSURE: 80 MMHG | OXYGEN SATURATION: 95 %

## 2018-01-22 LAB
BACTERIA BLD CULT: NORMAL
BACTERIA BLD CULT: NORMAL

## 2018-01-23 VITALS
TEMPERATURE: 98.1 F | DIASTOLIC BLOOD PRESSURE: 70 MMHG | HEART RATE: 75 BPM | OXYGEN SATURATION: 83 % | SYSTOLIC BLOOD PRESSURE: 143 MMHG

## 2018-01-23 VITALS
TEMPERATURE: 97.6 F | SYSTOLIC BLOOD PRESSURE: 118 MMHG | OXYGEN SATURATION: 88 % | RESPIRATION RATE: 16 BRPM | HEART RATE: 64 BPM | WEIGHT: 190 LBS | BODY MASS INDEX: 29.82 KG/M2 | DIASTOLIC BLOOD PRESSURE: 70 MMHG | HEIGHT: 67 IN

## 2018-01-25 ENCOUNTER — TRANSITIONAL CARE MANAGEMENT (OUTPATIENT)
Dept: INTERNAL MEDICINE CLINIC | Facility: CLINIC | Age: 83
End: 2018-01-25

## 2018-02-26 ENCOUNTER — TELEPHONE (OUTPATIENT)
Dept: PULMONOLOGY | Facility: CLINIC | Age: 83
End: 2018-02-26

## 2018-04-01 RX ORDER — AMLODIPINE BESYLATE 5 MG/1
TABLET ORAL
Qty: 180 TABLET | Refills: 0 | OUTPATIENT
Start: 2018-04-01